# Patient Record
Sex: MALE | Race: WHITE | NOT HISPANIC OR LATINO | Employment: FULL TIME | URBAN - METROPOLITAN AREA
[De-identification: names, ages, dates, MRNs, and addresses within clinical notes are randomized per-mention and may not be internally consistent; named-entity substitution may affect disease eponyms.]

---

## 2019-01-26 ENCOUNTER — OFFICE VISIT (OUTPATIENT)
Dept: URGENT CARE | Age: 54
End: 2019-01-26
Payer: COMMERCIAL

## 2019-01-26 VITALS
DIASTOLIC BLOOD PRESSURE: 111 MMHG | RESPIRATION RATE: 20 BRPM | SYSTOLIC BLOOD PRESSURE: 163 MMHG | WEIGHT: 225 LBS | HEART RATE: 80 BPM | OXYGEN SATURATION: 100 % | HEIGHT: 74 IN | BODY MASS INDEX: 28.88 KG/M2 | TEMPERATURE: 98.1 F

## 2019-01-26 DIAGNOSIS — R21 RASH OF GROIN: Primary | ICD-10-CM

## 2019-01-26 PROCEDURE — 99203 OFFICE O/P NEW LOW 30 MIN: CPT | Performed by: PHYSICIAN ASSISTANT

## 2019-01-26 RX ORDER — NYSTATIN 100000 [USP'U]/G
POWDER TOPICAL 3 TIMES DAILY
Qty: 15 G | Refills: 0 | Status: SHIPPED | OUTPATIENT
Start: 2019-01-26 | End: 2021-12-03

## 2019-01-26 RX ORDER — MULTIVITAMIN
1 CAPSULE ORAL DAILY
COMMUNITY

## 2019-01-26 RX ORDER — AMPICILLIN TRIHYDRATE 250 MG
CAPSULE ORAL DAILY
COMMUNITY
End: 2021-12-03

## 2019-01-26 RX ORDER — CEPHALEXIN 500 MG/1
500 CAPSULE ORAL EVERY 8 HOURS SCHEDULED
Qty: 21 CAPSULE | Refills: 0 | Status: SHIPPED | OUTPATIENT
Start: 2019-01-26 | End: 2019-02-02

## 2019-01-26 RX ORDER — CHLORAL HYDRATE 500 MG
1000 CAPSULE ORAL DAILY
COMMUNITY

## 2019-01-26 NOTE — PROGRESS NOTES
[facility]        NAME: Lynn Pan is a 47 y o  male  : 1965    MRN: [id number]  DATE: 2019  TIME: 12:35 PM    Assessment and Plan   Rash of groin [R21]  1  Rash of groin  cephalexin (KEFLEX) 500 mg capsule    nystatin (MYCOSTATIN) powder     Likely folliculitis  Possibly folliculitis on top of a fungal infection  Will treat with powder and antibiotic  Patient Instructions       Take medications as directed  Drink plenty of fluids  Follow up with family doctor this week  Go to ER immediately if new or worsening symptoms occur  Chief Complaint     Chief Complaint   Patient presents with    Rash     rash on buttock, left groin, and scrotum  Onset 5 days          History of Present Illness       Rash   This is a new problem  Episode onset: Five days ago  The problem has been gradually worsening since onset  Location: Gluteal cleft, inguinal area and scrotum  Rash characteristics: Red, raised, pimple like  He was exposed to nothing  Pertinent negatives include no diarrhea, fatigue, fever, joint pain, rhinorrhea, shortness of breath or vomiting  Past treatments include nothing  Review of Systems   Review of Systems   Constitutional: Negative  Negative for chills, fatigue and fever  HENT: Negative  Negative for rhinorrhea  Eyes: Negative  Respiratory: Negative  Negative for shortness of breath  Cardiovascular: Negative  Gastrointestinal: Negative  Negative for abdominal pain, diarrhea and vomiting  Endocrine: Negative  Genitourinary: Negative  Musculoskeletal: Negative for back pain, joint pain and neck pain  Skin: Positive for rash  Negative for color change, pallor and wound  Neurological: Negative for dizziness, weakness, light-headedness, numbness and headaches  Hematological: Negative  Psychiatric/Behavioral: Negative            Current Medications       Current Outpatient Prescriptions:     Multiple Vitamin (MULTIVITAMIN) capsule, Take 1 capsule by mouth daily, Disp: , Rfl:     Omega-3 Fatty Acids (FISH OIL) 1,000 mg, Take 1,000 mg by mouth daily, Disp: , Rfl:     Red Yeast Rice 600 MG CAPS, Take by mouth, Disp: , Rfl:     cephalexin (KEFLEX) 500 mg capsule, Take 1 capsule (500 mg total) by mouth every 8 (eight) hours for 7 days, Disp: 21 capsule, Rfl: 0    nystatin (MYCOSTATIN) powder, Apply topically 3 (three) times a day, Disp: 15 g, Rfl: 0    Current Allergies     Allergies as of 01/26/2019    (No Known Allergies)            The following portions of the patient's history were reviewed and updated as appropriate: allergies, current medications, past family history, past medical history, past social history, past surgical history and problem list      History reviewed  No pertinent past medical history  History reviewed  No pertinent surgical history  History reviewed  No pertinent family history  Medications have been verified  Objective   BP (!) 163/111   Pulse 80   Temp 98 1 °F (36 7 °C) (Tympanic)   Resp 20   Ht 6' 2" (1 88 m)   Wt 102 kg (225 lb)   SpO2 100%   BMI 28 89 kg/m²        Physical Exam     Physical Exam   Constitutional: He appears well-developed and well-nourished  No distress  HENT:   Head: Normocephalic and atraumatic  Cardiovascular: Normal rate, regular rhythm, normal heart sounds and intact distal pulses  Pulmonary/Chest: Effort normal and breath sounds normal  No respiratory distress  He has no wheezes  He has no rales  Skin: Skin is warm  Rash (Red raised pimple like rash to gluteal cleft, inguinal area and L side of scrotum  No pain to testes or penis itself ) noted  He is not diaphoretic  Nursing note and vitals reviewed

## 2019-01-26 NOTE — PATIENT INSTRUCTIONS
Take medications as directed  Drink plenty of fluids  Follow up with family doctor this week  Go to ER immediately if new or worsening symptoms occur  Folliculitis   WHAT YOU NEED TO KNOW:   Folliculitis is inflammation of your hair follicles  A hair follicle is a sac under your skin  Your hair grows out of the follicle  Folliculitis is caused by bacteria or fungus, most commonly a germ called Staph  Folliculitis can occur anywhere you have hair  DISCHARGE INSTRUCTIONS:   Medicines:   · Antibiotics: This medicine is given to fight or prevent an infection caused by bacteria  It may be given as an ointment that you apply to your skin or as a pill  Always take your antibiotics exactly as ordered by your healthcare provider  Never save antibiotics or take leftover antibiotics that were given to you for another illness  · Antifungal medicine: This medicine helps kill fungus that may be causing your folliculitis  It may be given as an cream that you apply to your skin or as a pill  · NSAIDs , such as ibuprofen, help decrease swelling, pain, and fever  This medicine is available with or without a doctor's order  NSAIDs can cause stomach bleeding or kidney problems in certain people  If you take blood thinner medicine, always ask if NSAIDs are safe for you  Always read the medicine label and follow directions  Do not give these medicines to children under 10months of age without direction from your child's healthcare provider  · Antihistamines: This medicine may be given to help decrease itching  · Take your medicine as directed  Contact your healthcare provider if you think your medicine is not helping or if you have side effects  Tell him of her if you are allergic to any medicine  Keep a list of the medicines, vitamins, and herbs you take  Include the amounts, and when and why you take them  Bring the list or the pill bottles to follow-up visits   Carry your medicine list with you in case of an emergency  Follow up with your healthcare provider or dermatologist as directed:  Write down your questions so you remember to ask them during your visits  Manage folliculitis:   · Use warm compresses:  Wet a washcloth with warm water and apply it to the infected skin area to help decrease pain and swelling  Warm compresses may also help drain pus and improve healing  · Clean the area:  Use antibacterial soap to wash the affected area  Change your washcloths and towels every day  · Avoid shaving the area: If possible, do not shave areas that have folliculitis  If you must shave, use an electric razor or new blade every time you shave  Prevent folliculitis:   · Do not share personal items:  Do not share towels, soap, or any personal items with other people  · Do not wear tight clothing:  Do not wear tight-fitting clothes that rub against and irritate your skin  · Treat skin injuries right away:  Treat injuries such as cuts and scrapes right away  Wash them with warm, soapy water, and cover the area to prevent infection  Contact your healthcare provider or dermatologist if:  · You have a fever  · You have foul-smelling pus coming from the bumps on your skin  · Your rash is spreading  · You have questions or concerns about your condition or care  Return to the emergency department if:  · You develop large areas of red, warm, tender skin around the folliculitis  · You develop boils  © 2017 [address] Information is for End User's use only and may not be sold, redistributed or otherwise used for commercial purposes  All illustrations and images included in CareNotes® are the copyrighted property of A D A M , Inc  or Siva Richardson  The above information is an  only  It is not intended as medical advice for individual conditions or treatments   Talk to your doctor, nurse or pharmacist before following any medical regimen to see if it is safe and effective for you

## 2021-01-20 ENCOUNTER — TELEPHONE (OUTPATIENT)
Dept: INTERNAL MEDICINE CLINIC | Facility: CLINIC | Age: 56
End: 2021-01-20

## 2021-01-20 NOTE — TELEPHONE ENCOUNTER
If his wife is positive he has to isolate himself and sleep in a separate room and they have to wear masks and use  toilets if possible

## 2021-01-25 ENCOUNTER — TELEMEDICINE (OUTPATIENT)
Dept: INTERNAL MEDICINE CLINIC | Facility: CLINIC | Age: 56
End: 2021-01-25
Payer: COMMERCIAL

## 2021-01-25 VITALS — WEIGHT: 225 LBS | TEMPERATURE: 100 F | HEIGHT: 74 IN | BODY MASS INDEX: 28.88 KG/M2

## 2021-01-25 DIAGNOSIS — Z20.828 EXPOSURE TO SARS-ASSOCIATED CORONAVIRUS: Primary | ICD-10-CM

## 2021-01-25 PROCEDURE — 3725F SCREEN DEPRESSION PERFORMED: CPT | Performed by: INTERNAL MEDICINE

## 2021-01-25 PROCEDURE — 99213 OFFICE O/P EST LOW 20 MIN: CPT | Performed by: INTERNAL MEDICINE

## 2021-01-25 NOTE — PROGRESS NOTES
COVID-19 Virtual Visit     Assessment/Plan:    Problem List Items Addressed This Visit     None      Visit Diagnoses     Exposure to SARS-associated coronavirus    -  Primary    Relevant Orders    Novel Coronavirus (Covid-19),PCR [facility] - Collected at [facility] or [facility]         Disposition:     I referred patient to one of our centralized sites for a COVID-19 swab  I have spent 15 minutes directly with the patient  Greater than 50% of this time was spent in counseling/coordination of care regarding: instructions for management, patient and family education, importance of treatment compliance and risk factor reductions  Encounter provider Frances Snell MD    Provider located at [address] [address] [phone]    Recent Visits  No visits were found meeting these conditions  Showing recent visits within past 7 days and meeting all other requirements     Today's Visits  Date Type Provider Dept   01/25/21 Telemedicine Frances Snell MD [facility]  [address] today's visits and meeting all other requirements     Future Appointments  No visits were found meeting these conditions  Showing future appointments within next 150 days and meeting all other requirements        Patient agrees to participate in a virtual check in via telephone or video visit instead of presenting to the office to address urgent/immediate medical needs  Patient is aware this is a billable service  After connecting through [facility], the patient was identified by name and date of birth  Chiki Alford was informed that this was a telemedicine visit and that the exam was being conducted confidentially over secure lines  My office door was closed  No one else was in the room  Chiki Alford acknowledged consent and understanding of privacy and security of the telemedicine visit   I informed the patient that I have reviewed his record in Epic and presented the opportunity for him to ask any questions regarding the visit today  The patient agreed to participate  Subjective:   Dorothy Schwartz is a 48 y o  male who is concerned about COVID-19  Patient's symptoms include fever and headache  Patient denies shortness of breath, nausea, vomiting and diarrhea  Date of symptom onset: 1/21/2021    Exposure:   Contact with a person who is under investigation (PUI) for or who is positive for COVID-19 within the last 14 days?: Yes    Hospitalized recently for fever and/or lower respiratory symptoms?: No      Currently a healthcare worker that is involved in direct patient care?: No      Works in a special setting where the risk of COVID-19 transmission may be high? (this may include long-term care, correctional and shelter facilities; homeless shelters; assisted-living facilities and group homes ): No      Resident in a special setting where the risk of COVID-19 transmission may be high? (this may include long-term care, correctional and shelter facilities; homeless shelters; assisted-living facilities and group homes ): No      No results found for: Deepa Guillen, [facility], [address] [place] [address]  No past medical history on file  No past surgical history on file  Current Outpatient Medications   Medication Sig Dispense Refill    Multiple Vitamin (MULTIVITAMIN) capsule Take 1 capsule by mouth daily      nystatin (MYCOSTATIN) powder Apply topically 3 (three) times a day 15 g 0    Omega-3 Fatty Acids (FISH OIL) 1,000 mg Take 1,000 mg by mouth daily      Red Yeast Rice 600 MG CAPS Take by mouth       No current facility-administered medications for this visit  No Known Allergies    Review of Systems   Constitutional: Positive for fever  Respiratory: Negative for shortness of breath  Gastrointestinal: Negative for diarrhea, nausea and vomiting  Neurological: Positive for headaches  Objective:    Vitals:    01/25/21 1246   Temp: 100 °F (37 8 °C)   TempSrc: Temporal   Weight: 102 kg (225 lb)   Height: 6' 2" (1 88 m)       Physical Exam  Constitutional:       Appearance: Normal appearance  HENT:      Mouth/Throat:      Mouth: Mucous membranes are moist    Neck:      Musculoskeletal: Normal range of motion and neck supple  Pulmonary:      Effort: Pulmonary effort is normal    Neurological:      General: No focal deficit present  Mental Status: He is alert  VIRTUAL VISIT DISCLAIMER    Raiza Villa acknowledges that he has consented to an online visit or consultation  He understands that the online visit is based solely on information provided by him, and that, in the absence of a face-to-face physical evaluation by the physician, the diagnosis he receives is both limited and provisional in terms of accuracy and completeness  This is not intended to replace a full medical face-to-face evaluation by the physician  Raiza Villa understands and accepts these terms

## 2021-01-26 ENCOUNTER — TELEPHONE (OUTPATIENT)
Dept: INTERNAL MEDICINE CLINIC | Facility: CLINIC | Age: 56
End: 2021-01-26

## 2021-01-26 DIAGNOSIS — Z20.828 EXPOSURE TO SARS-ASSOCIATED CORONAVIRUS: Primary | ICD-10-CM

## 2021-01-26 DIAGNOSIS — Z20.828 EXPOSURE TO SARS-ASSOCIATED CORONAVIRUS: ICD-10-CM

## 2021-01-26 PROCEDURE — U0005 INFEC AGEN DETEC AMPLI PROBE: HCPCS | Performed by: INTERNAL MEDICINE

## 2021-01-26 PROCEDURE — U0003 INFECTIOUS AGENT DETECTION BY NUCLEIC ACID (DNA OR RNA); SEVERE ACUTE RESPIRATORY SYNDROME CORONAVIRUS 2 (SARS-COV-2) (CORONAVIRUS DISEASE [COVID-19]), AMPLIFIED PROBE TECHNIQUE, MAKING USE OF HIGH THROUGHPUT TECHNOLOGIES AS DESCRIBED BY CMS-2020-01-R: HCPCS | Performed by: INTERNAL MEDICINE

## 2021-01-26 RX ORDER — METHYLPREDNISOLONE 4 MG/1
TABLET ORAL
Qty: 21 EACH | Refills: 0 | Status: SHIPPED | OUTPATIENT
Start: 2021-01-26 | End: 2021-12-03

## 2021-01-27 ENCOUNTER — TELEMEDICINE (OUTPATIENT)
Dept: INTERNAL MEDICINE CLINIC | Facility: CLINIC | Age: 56
End: 2021-01-27
Payer: COMMERCIAL

## 2021-01-27 VITALS — BODY MASS INDEX: 28.88 KG/M2 | WEIGHT: 225 LBS | HEIGHT: 74 IN

## 2021-01-27 DIAGNOSIS — Z20.828 EXPOSURE TO SARS-ASSOCIATED CORONAVIRUS: ICD-10-CM

## 2021-01-27 DIAGNOSIS — U07.1 COVID-19: Primary | ICD-10-CM

## 2021-01-27 PROCEDURE — 1036F TOBACCO NON-USER: CPT | Performed by: INTERNAL MEDICINE

## 2021-01-27 PROCEDURE — 99213 OFFICE O/P EST LOW 20 MIN: CPT | Performed by: INTERNAL MEDICINE

## 2021-01-27 PROCEDURE — 3008F BODY MASS INDEX DOCD: CPT | Performed by: INTERNAL MEDICINE

## 2021-01-27 NOTE — PROGRESS NOTES
COVID-19 Virtual Visit     Assessment/Plan:    Problem List Items Addressed This Visit     None      Visit Diagnoses     COVID-19    -  Primary    Exposure to SARS-associated coronavirus             Disposition:     I have spent 15 minutes directly with the patient  Greater than 50% of this time was spent in counseling/coordination of care regarding: instructions for management, patient and family education, risk factor reductions and impressions  Encounter provider Chandra Schrader MD    Provider located at [address] [address] [phone]    Recent Visits  Date Type Provider Dept   01/27/21 [address], [address] [facility]   01/26/21 Telephone MD Santana Rader [facility]  74 [facility]   01/25/21 Telemedicine MD Santana Rader [facility]  74 Internal Med [address] recent visits within past 7 days and meeting all other requirements     Future Appointments  No visits were found meeting these conditions  Showing future appointments within next 150 days and meeting all other requirements      This virtual check-in was done via [employer] and patient was informed that this is not a secure, HIPAA-compliant platform  He agrees to proceed  Patient agrees to participate in a virtual check in via telephone or video visit instead of presenting to the office to address urgent/immediate medical needs  Patient is aware this is a billable service  After connecting through [facility], the patient was identified by name and date of birth  Nesha Mohs was informed that this was a telemedicine visit and that the exam was being conducted confidentially over secure lines  Nesha Mohs acknowledged consent and understanding of privacy and security of the telemedicine visit   I informed the patient that I have reviewed his record in Epic and presented the opportunity for him to ask any questions regarding the visit today  The patient agreed to participate  Subjective:   Dulce Terry is a 48 y o  male who has been screened for COVID-19  Symptom change since last report: improving  Patient's symptoms include chills, fatigue, malaise and nasal congestion  Patient denies fever, sore throat, shortness of breath, abdominal pain, diarrhea and headaches  Kaleb Lucio has been staying home and has isolated themselves in his home  He is taking care to not share personal items and is cleaning all surfaces that are touched often, like counters, tabletops, and doorknobs using household cleaning sprays or wipes  He is wearing a mask when he leaves his room  Lab Results   Component Value Date    SARSCOV2 Positive (A) 01/26/2021     Past Medical History:   Diagnosis Date    No pertinent past medical history      Past Surgical History:   Procedure Laterality Date    COLONOSCOPY  02/10/2017    HERNIA REPAIR      age 12      Current Outpatient Medications   Medication Sig Dispense Refill    methylPREDNISolone 4 MG tablet therapy pack Use as directed on package 21 each 0    Multiple Vitamin (MULTIVITAMIN) capsule Take 1 capsule by mouth daily      Omega-3 Fatty Acids (FISH OIL) 1,000 mg Take 1,000 mg by mouth daily      Red Yeast Rice 600 MG CAPS Take by mouth      nystatin (MYCOSTATIN) powder Apply topically 3 (three) times a day 15 g 0     No current facility-administered medications for this visit  No Known Allergies    Review of Systems   Constitutional: Positive for chills and fatigue  Negative for appetite change and fever  HENT: Positive for congestion  Negative for sore throat and trouble swallowing  Eyes: Negative for redness  Respiratory: Negative for shortness of breath  Cardiovascular: Negative for chest pain and palpitations  Gastrointestinal: Negative for abdominal pain, constipation and diarrhea  Genitourinary: Negative for dysuria and hematuria     Musculoskeletal: Negative for back pain and neck pain  Skin: Negative for rash  Neurological: Negative for seizures, weakness and headaches  Hematological: Negative for adenopathy  Psychiatric/Behavioral: Negative for confusion  The patient is not nervous/anxious  Objective:    Vitals:    01/27/21 1438   Weight: 102 kg (225 lb)   Height: 6' 2" (1 88 m)       Physical Exam  Constitutional:       Appearance: Normal appearance  HENT:      Head: Normocephalic and atraumatic  Mouth/Throat:      Mouth: Mucous membranes are moist    Eyes:      Extraocular Movements: Extraocular movements intact  Pupils: Pupils are equal, round, and reactive to light  Neck:      Musculoskeletal: Normal range of motion and neck supple  Pulmonary:      Effort: Pulmonary effort is normal    Musculoskeletal: Normal range of motion  Neurological:      General: No focal deficit present  Mental Status: He is alert and oriented to person, place, and time  Mental status is at baseline  Psychiatric:         Mood and Affect: Mood normal          Behavior: Behavior normal        VIRTUAL VISIT DISCLAIMER    Chiki Alford acknowledges that he has consented to an online visit or consultation  He understands that the online visit is based solely on information provided by him, and that, in the absence of a face-to-face physical evaluation by the physician, the diagnosis he receives is both limited and provisional in terms of accuracy and completeness  This is not intended to replace a full medical face-to-face evaluation by the physician  Chiki Alford understands and accepts these terms

## 2021-01-28 LAB — SARS-COV-2 RNA RESP QL NAA+PROBE: POSITIVE

## 2021-01-28 NOTE — PATIENT INSTRUCTIONS
COVID-19 and Chronic Health Conditions   AMBULATORY CARE:   What you need to know about coronavirus disease 2019 (COVID-19) and chronic health conditions: Your chronic condition may increase your risk for COVID-19 or serious problems it can cause, such as pneumonia  Healthcare providers might need to make changes that affect how you usually manage your chronic health condition  Providers may change hours of operation or not have patients come in to be seen  You may not be able to make appointments to get blood drawn or to have tests or procedures  This may continue until the virus that causes COVID-19 is controlled  Until then, you can take steps to manage your condition  The steps will also lower your risk for COVID-19 or the serious problems it causes  The following increase your risk for serious effects of COVID-19:   · Age 72 years or older    · Obesity, diabetes, cancer, or a liver disease    · Kidney failure, chronic kidney disease, or sickle cell disease    · Lung disease, COPD, moderate-to-severe asthma, cystic fibrosis, or pulmonary fibrosis (scarring in your lungs)    · A severe heart disease, such as coronary artery disease or heart failure    · A brain blood vessel disorder or disease, or a condition such as dementia    · Living in a nursing home or long-term care facility    · Smoking cigarettes    · Hypertension (high blood pressure) or thalassemia    · An immune system problem, HIV or AIDS, or a blood or bone marrow transplant    · Long-term use of certain medicines, such as corticosteroids    · Pregnancy    If you think you may be infected with the new coronavirus,  do the following to protect others:  · If emergency care is needed,  tell the  about the possible infection, or call ahead and tell the emergency department  · Call a healthcare provider  for instructions if symptoms are mild   Do not  arrive without calling first  Your provider will need to protect staff members and other patients  · Cover your mouth and nose  while you are getting medical care  This will help lower the risk of infecting others  Call your local emergency number [phone] in the [address] [facility] or emergency department if:   · You have trouble breathing or shortness of breath  · You have chest pain or pressure that lasts longer than 5 minutes  · You become confused or hard to wake  · Your lips or face are blue  · You have a fever of 104°F (40°C) or higher  Call your doctor or healthcare provider if:   · You do not  have symptoms of COVID-19 but had close physical contact within 14 days with someone who tested positive  · You have questions or concerns about your COVID-19 or your chronic condition  The following may increase your risk for serious effects of COVID-19:   · Age 72 years or older    · Obesity, diabetes, cancer, or a liver disease    · Kidney failure, chronic kidney disease, or sickle cell disease    · Lung disease, COPD, moderate-to-severe asthma, cystic fibrosis, or pulmonary fibrosis (scarring in your lungs)    · A severe heart disease, such as coronary artery disease or heart failure    · A brain blood vessel disorder or disease, or a condition such as dementia    · Living in a nursing home or long-term care facility    · Smoking cigarettes    · Hypertension (high blood pressure) or thalassemia    · An immune system problem, HIV or AIDS, a blood or bone marrow transplant    · Long-term use of certain medicines, such as corticosteroids    · Pregnancy    Manage your chronic health condition during this time:  If you do not have a regular healthcare provider, experts recommend you contact a local community health center or health department  The following can help you manage your condition and prevent COVID-19:  · Get emergency care for your condition if needed  Talk to your healthcare providers about symptoms of your chronic condition that need immediate care   Your providers can help you create a plan or add exacerbation management to your plan  The plan will include when to go to an emergency department and when to call your local emergency number  This will depend on where you live and the services that are available during this time  · Go to dialysis appointments as scheduled  It is important to stay on schedule  You will need to have enough food to be able to follow the emergency diet plan if you must miss a session  The emergency diet needs to be part of the management plan for your condition  · Reschedule any upcoming appointments as needed  Medical facilities may be closed until the new coronavirus is better controlled  This means you may need to reschedule a surgery, procedure, or check-up appointment  If you cannot have a phone or video appointment, you will need to make a new appointment  Some providers may be scheduling appointments several months in advance  Some surgeries and procedures will happen as scheduled  This depends on the medical condition and the reason for the surgery or procedure  You may need to have extra testing for COVID-19 several days before  · Follow any regular management plan you use  Your healthcare provider will tell you if you need to make any changes to your regular management plan  For example, if you have asthma, continue to follow your asthma action plan  If you have diabetes, you may need to check your blood sugar level more often  Stress and illness can make blood sugar levels go up  You may need to adjust medicine such as insulin  If you have a heart condition or high blood pressure, you may need to check your blood pressure more often  Stress and illness can also raise your blood pressure  · Talk to your healthcare providers about your medicines  You may be able to get more than 1 month of medicine at a time  This will lower the number of times you need to go to a pharmacy to get your medicines   Make sure you have enough medicine if you have a condition that can lead to an emergency  Examples include asthma medicines, insulin, or an epinephrine pen  Check the expiration dates on the medicines you currently have  Ask for refills as soon as possible, if needed  If it is not time to refill prescriptions, you may be able to get an emergency supply of some medicines  Medicine plans vary, so ask your healthcare provider or pharmacist for options  · Have supplies available in your home  If possible, get extra supplies you use regularly  Examples include absorbent pads, syringes, and wound cleaning solutions  This will limit the number of trips out of your home to get supplies  · Know the signs and symptoms of COVID-19  Signs and symptoms usually start about 5 days after infection but can take 2 to 14 days  Signs and symptoms range from mild to severe  You may feel like you have the flu or a bad cold  Your chronic health condition may cause some of the same symptoms COVID-19 causes  This can make it hard to know if a symptom is from COVID-19 or your chronic condition  Keep a record of any new or worsening symptom you have  This is especially important if you have a condition that often causes shortness of breath  Your provider can tell you if you should be tested for COVID-19  Information is still being learned  Tell your healthcare provider if you think you were infected but develop signs or symptoms not listed below:    ? A cough    ? Shortness of breath or trouble breathing that may become severe    ? A fever of at least 100 4°F, or 38°C (may be lower in adults 65 or older)    ? Chills that might include shaking    ? Muscle pain, body aches, or a headache    ? A sore throat    ? Suddenly not being able to taste or smell anything    ? Feeling very tired (fatigue)    ? Congestion (stuffy head and nose), or a runny nose    ?  Diarrhea, nausea, or vomiting    What you can do to prevent having to go out of your home during this time:   · Ask your healthcare provider for other ways to have appointments  You may be able to have appointments without having to go into the provider's office  Some providers offer phone, video, or other types of appointments  · Have food, medicines, and other supplies delivered  Some pharmacies can send certain medicines to you through the mail  Grocery stores and restaurants may be able to deliver food and other items  If possible, have delivered items placed somewhere  Try not to have someone hand you an item  You will be so close to the person that the virus can spread between you  Wash your hands after you put the delivered items away  · Ask someone to get items you need  The person can get groceries, medicines, or other needed items for you  Choose a person who does not have signs or symptoms of COVID-19 or has tested negative for it  The person should not be waiting for test results  He or she should not have a condition that increases the risk for COVID-19 or serious problems it causes  Lower your risk for COVID-19:  The virus that causes COVID-19 spreads quickly and easily  It mainly spreads through droplets that form when a person talks, coughs, or sneezes  An infected person may also be able to leave the virus on objects and surfaces  Another person can get the virus on his or her hands by touching the object or surface  Infection happens if the person then touches his or her eyes or mouth with unwashed hands  The best way to prevent infection is to avoid anyone who is infected, but this can be hard to do  An infected person can spread the virus before signs or symptoms begin, or even if signs or symptoms never develop  The following are ways to prevent the spread of the virus and lower your risk for COVID-19:      · Wash your hands often throughout the day  Use soap and water  Rub your soapy hands together, lacing your fingers  Wash the front and back of each hand, and in between your fingers   Use the fingers of one hand to scrub under the fingernails of the other hand  Wash for at least 20 seconds  Rinse with warm, running water for several seconds  Then dry your hands with a clean towel or paper towel  Use hand  that contains alcohol if soap and water are not available  Do not touch your eyes, nose, or mouth without washing your hands first  Teach children how to wash their hands  · Cover sneezes and coughs  Turn your face away and cover your mouth and nose with a tissue  Throw the tissue away  Use the bend of your arm if a tissue is not available  Then wash your hands well with soap and water or use hand   Turn your head and cover your face if someone near you is coughing or sneezing  Teach children how to cover a cough or sneeze  Remind them to wash their hands  · Make a habit of not touching your face  If you get the virus on your hands, you can transfer it to your eyes, nose, or mouth and become infected  · Clean and disinfect high-touch surfaces and objects in your home often  Do this regularly, even if you think no one living in or coming to your home is infected with the virus  Surfaces include countertops, cupboard doors, desks, handles, handrails, doorknobs, toilet handles, faucets, chairs, and light switches  Objects include keys, phones, computer keyboards and mice, video games, remote controls, and children's toys  Some surfaces and objects may need to be cleaned several times each day, depending on how often they are used  ? Use disinfecting wipes or a disinfecting solution  You can make a solution by mixing 4 teaspoons of bleach with 1 quart (4 cups) of water  Clean with a sponge or cloth that can be thrown away or washed in hot, soapy water and reused  Clean used dishes and utensils in hot, soapy water or in the   ? Be careful with   Do not use any cleaning or disinfecting products that can trigger an asthma attack or other breathing problems   Open windows or have circulating air as you clean  Do not  mix ammonia with bleach  This will create toxic fumes  Read the labels of all products you use  · Ask about vaccines you may need  No vaccine is available yet for the new coronavirus  It is still a good idea to get other vaccines to help protect your immune system  Get the influenza (flu) vaccine as soon as recommended each year, usually starting in September or October  A flu infection can make COVID-19 worse if you have them at the same time  The flu can also cause signs and symptoms that are similar to COVID-19  Get the pneumonia vaccine if recommended  Your healthcare provider can tell you if you also need other vaccines, and when to get them  Follow social distancing guidelines if you must go out of your home during this time:  Social distancing means people stay far enough apart physically that the virus cannot spread from one person to another  National and local social distancing rules vary  Rules may change over time as restrictions are lifted, but they may return if an outbreak happens where you live  It is important to know and follow all current social distancing rules in your area  The following are general guidelines:  · Limit trips out of your home  Most local guidelines allow leaving the home to buy food or supplies, or to seek medical care if necessary  · Stay at least 6 feet (2 meters) away from anyone who does not live in your home  Do not shake hands with, hug, or kiss a person as a greeting  Stand or walk as far from others as possible, especially around anyone who is sneezing or coughing  If you must use public transportation (such as a bus or subway), try to sit or stand away from others  You can stay safely connected with others through phone calls, e-mail messages, social media websites, and video chats  Check in on anyone who may be having a hard time socially distancing, or who lives alone   Ask administrators at nursing [facility] or long-term care facilities how you can safely communicate with someone living there  · Wear a cloth face covering (mask) when you must go out  Only do this if your chronic condition does not cause breathing problems  You can make a face covering out of a thick cloth  This will save medical masks for healthcare workers and first responders  Choose fabric that holds its shape after it is washed and dried, such as cotton  Put the top half of a paper coffee filter in the middle of the fabric to create an extra filter for you  Check that you can breathe through the fabric when it is folded into several layers  Fold the fabric into a long band  Put each end through a rubber band or hair tie to create ear loops  Fold the ends of the fabric toward the middle  Hold the covering to your face  Adjust it so it covers your nose and mouth completely  Hook the loops onto your ears to keep the covering in place  The following are important points to remember:     ? Do not use a plastic face shield instead of a cloth covering  A face shield will not protect others from droplets  If a face shield must be used, choose one that wraps around both sides of the face and goes below the chin  Do not use disposable shields more than 1 time  Merly Wagner that can be used again need to be cleaned and disinfected between uses  Do not put a face shield or a cloth covering on a  or infant  These increase the risk for sudden infant death syndrome (SIDS)  ? Continue social distancing while you are out  A face covering does not mean you can have close physical contact with others  You still need to stay at least 6 feet (2 meters) away from others  ? Do not touch your face covering or eyes while you are wearing the covering  Your eyes will not be covered by the cloth  You can be infected if the virus is on your hand and you touch your eyes   Wash your hands with soap and water for 20 seconds or use hand  before you remove your face covering  ? Do not remove your face covering to talk, sneeze, or cough  Your face covering will help protect you and others around you  ? Wash face coverings often  Wash them in a washing machine in the warmest water the fabric allows  Make sure the fabric is completely dry before you use the face covering again  Only wear clean coverings when you go out  Use a new coffee filter each time  ? Certain individuals should not use face coverings  Do not put a face covering on anyone who is younger than 2 years  Mohan Oro children may not be able to breathe through the covering  Do not put a face covering on anyone who cannot remove it by himself or herself  ? You can use a clear face covering if others need to read your lips  A clear covering may be helpful if you communicate with someone who is deaf or hard of hearing  A clear covering is made of cloth but has plastic over the mouth area  This will help the person see your lips more easily  Do not use a plastic face shield instead  ? Do not use face coverings that have breathing valves or vents  Valves or vents on the sides are designed to allow breathed air to go out  This makes breathing easier, but the virus can travel out of the valve or vent and be spread to others  · Do not have anyone over to your home unless it is necessary  It is okay to have medical workers in to provide care  Wear your face covering, and remind medical workers to wear a mask or face covering  Remind them to wash their hands when they arrive and before they leave  Do not  have family members, friends, or neighbors over, even if they are not sick  A person can pass the new virus to others before symptoms of COVID-19 begin  Some people never even develop symptoms  Children commonly have mild symptoms or no symptoms  It is important that you continue social distancing with everyone, including children  It may be hard to tell a child not to hug or kiss you   Explain that this is how he or she can help you stay healthy  · Do not go to someone else's home unless it is necessary  Do not go over to visit, even if the person is lonely  Only go if you need to help him or her  · Avoid large gatherings and crowds  Gatherings or crowds of 10 or more individuals can cause the virus to spread  Examples of gatherings include parties, sporting events, [employer] services, and conferences  Crowds may form at beaches, [place], and tourist attractions  You can continue to protect yourself by staying away from large gatherings and crowds  · Stay safe if you must go out to work  You may have a job only done outside your home that is considered essential  Keep physical distance between you and other workers as much as possible  Follow your employer's rules so everyone stays safe  Help strengthen your immune system:   · Do not smoke  Nicotine and other chemicals in cigarettes and cigars can cause lung damage and increase your risk for infections such as bronchitis or pneumonia  Ask your healthcare provider for information if you currently smoke and need help to quit  E-cigarettes or smokeless tobacco still contain nicotine  Talk to your healthcare provider before you use these products  · Eat a variety of healthy foods  Examples include vegetables, fruits, whole-grain breads and cereals, lean meats and poultry, fish, low-fat dairy products, and cooked beans  Healthy foods contain nutrients that help keep your immune system strong  · Find ways to manage stress  You may be feeling more stressed than usual because of the COVID-19 outbreak  The situation is very stressful to many people  Talk to your healthcare providers about ways to manage stress during this time  Stress can lead to breathing problems or make the problems worse  Stress can trigger an attack or exacerbation of many health conditions   It is important to do things that help you feel more relaxed, such as the following:     ? Pick 1 or 2 times a day to watch the news  Constant news watching can increase your stress levels  ? Talk to a friend on the phone or through a video chat  ? Take a warm, soothing bath  ? Listen to music  ? Exercise can also help relieve stress  This may be hard if your regular gym or outdoor exercise area is closed  If you do not have exercise equipment at home, try walking inside your home  You can walk quickly or turn on music and dance  Follow up with your doctor or healthcare provider as directed: Your providers will tell you when you can come in for tests, procedures, or check-ups  Bring your symptom record with you to all appointments  Write down your questions so you remember to ask them during your visits  For more information:   · Centers for Disease Control and Prevention  [address] , [address]  Phone: 7- 447 - 5705630  Phone: 1- 121 - 8339216  Web Address: [employer] com     © Copyright [address] Information is for End User's use only and may not be sold, redistributed or otherwise used for commercial purposes  All illustrations and images included in CareNotes® are the copyrighted property of [employer]  or [facility] Gunjan Hoang   The above information is an  only  It is not intended as medical advice for individual conditions or treatments  Talk to your doctor, nurse or pharmacist before following any medical regimen to see if it is safe and effective for you

## 2021-01-29 ENCOUNTER — TELEPHONE (OUTPATIENT)
Dept: ADMINISTRATIVE | Facility: OTHER | Age: 56
End: 2021-01-29

## 2021-01-29 NOTE — LETTER
Procedure Request Form: Colonoscopy      Date Requested: 21  Patient: Wendy Jamison  Patient : 1965   Referring Provider: Render Christians, MD        Date of Procedure ______________________________       The above patient has informed us that they have completed their   most recent Colonoscopy at your facility  Please complete   this form and attach all corresponding procedure reports/results  Comments __________________________________________________________  ____________________________________________________________________  ____________________________________________________________________  ____________________________________________________________________    Facility Completing Procedure _________________________________________    Form Completed By (print name) _______________________________________      Signature __________________________________________________________      These reports are needed for  compliance    Please fax this completed form and a copy of the procedure report to our office located at [place] [address] as soon as possible to [phone] nan Mcelroy: Phone [phone]    We thank you for your assistance in treating our mutual patient

## 2021-01-29 NOTE — TELEPHONE ENCOUNTER
----- Message from Ede Branch sent at 1/27/2021  2:40 PM EST -----  Regarding: CRC  01/27/21 2:41 PM    Hello, our patient Shawanda Fraga has had CRC: Colonoscopy completed/performed  Please assist in updating the patient chart by making an External outreach to [facility] facility located in [place] [address]  The date of service is 2/10/17      Thank you,  Sandy Rome PG [facility] INTERNAL MED

## 2021-01-29 NOTE — LETTER
Procedure Request Form: Colonoscopy      Date Requested: 21  Patient: Wendy Swift  Patient : 1965   Referring Provider: Render Christians, MD        Date of Procedure ______________________________       The above patient has informed us that they have completed their   most recent Colonoscopy and CT Colonography at your facility  Please complete   this form and attach all corresponding procedure reports/results  Comments __________________________________________________________  ____________________________________________________________________  ____________________________________________________________________  ____________________________________________________________________    Facility Completing Procedure _________________________________________    Form Completed By (print name) _______________________________________      Signature __________________________________________________________      These reports are needed for  compliance    Please fax this completed form and a copy of the procedure report to our office located at [place] [address] as soon as possible to [phone] [facility]: Phone [phone]    We thank you for your assistance in treating our mutual patient

## 2021-02-04 NOTE — TELEPHONE ENCOUNTER
Upon review of the In Basket request we were able to locate, review, and update the patient chart as requested for CRC: Colonoscopy  Any additional questions or concerns should be emailed to the Practice Liaisons via [email or web address] email, please do not reply via In Basket      Thank you  Tania Kasper

## 2021-02-04 NOTE — TELEPHONE ENCOUNTER
As a follow-up, a second attempt has been made for outreach via fax, please see Contacts section for details      Thank you  Jaime Vo

## 2021-04-05 DIAGNOSIS — Z23 ENCOUNTER FOR IMMUNIZATION: ICD-10-CM

## 2021-04-16 ENCOUNTER — OFFICE VISIT (OUTPATIENT)
Dept: INTERNAL MEDICINE CLINIC | Facility: CLINIC | Age: 56
End: 2021-04-16
Payer: COMMERCIAL

## 2021-04-16 VITALS
SYSTOLIC BLOOD PRESSURE: 147 MMHG | DIASTOLIC BLOOD PRESSURE: 94 MMHG | OXYGEN SATURATION: 99 % | HEIGHT: 74 IN | HEART RATE: 79 BPM | WEIGHT: 223.8 LBS | TEMPERATURE: 98.6 F | BODY MASS INDEX: 28.72 KG/M2

## 2021-04-16 DIAGNOSIS — Z00.00 ANNUAL PHYSICAL EXAM: ICD-10-CM

## 2021-04-16 DIAGNOSIS — Z00.01 ENCOUNTER FOR GENERAL ADULT MEDICAL EXAMINATION WITH ABNORMAL FINDINGS: Primary | ICD-10-CM

## 2021-04-16 DIAGNOSIS — R03.0 WHITE COAT SYNDROME WITH HIGH BLOOD PRESSURE BUT WITHOUT HYPERTENSION: ICD-10-CM

## 2021-04-16 DIAGNOSIS — Z12.5 PROSTATE CANCER SCREENING: ICD-10-CM

## 2021-04-16 DIAGNOSIS — E55.9 VITAMIN D DEFICIENCY: ICD-10-CM

## 2021-04-16 PROCEDURE — 99396 PREV VISIT EST AGE 40-64: CPT | Performed by: INTERNAL MEDICINE

## 2021-04-16 PROCEDURE — 3008F BODY MASS INDEX DOCD: CPT | Performed by: INTERNAL MEDICINE

## 2021-04-16 PROCEDURE — 1036F TOBACCO NON-USER: CPT | Performed by: INTERNAL MEDICINE

## 2021-04-16 NOTE — PATIENT INSTRUCTIONS

## 2021-04-16 NOTE — PROGRESS NOTES
ADULT ANNUAL [address] [facility] INTERNAL MEDICINE    NAME: Gerry Gallo  AGE: 48 y o  SEX: male  : 1965     DATE: 2021     Assessment and Plan:     Problem List Items Addressed This Visit     None      Visit Diagnoses     Encounter for general adult medical examination with abnormal findings    -  Primary    White coat syndrome with high blood pressure but without hypertension        Vitamin D deficiency        Annual physical exam              Immunizations and preventive care screenings were discussed with patient today  Appropriate education was printed on patient's after visit summary  Counseling:  · Alcohol/drug use: discussed moderation in alcohol intake, the recommendations for healthy alcohol use, and avoidance of illicit drug use  No follow-ups on file  Chief Complaint:     Chief Complaint   Patient presents with    Physical Exam     yearly physical      History of Present Illness:     Adult Annual Physical   Patient here for a comprehensive physical exam  The patient reports no problems  Diet and Physical Activity  · Diet/Nutrition: well balanced diet  · Exercise: no formal exercise  Depression Screening  PHQ-9 Depression Screening    PHQ-9:   Frequency of the following problems over the past two weeks:           General Health  · Sleep: gets 4-6 hours of sleep on average  · Hearing: normal - bilateral   · Vision: no vision problems and previous LASIK surgery  · Dental: regular dental visits   Health  · Symptoms include: none     Review of Systems:     Review of Systems   Constitutional: Negative for appetite change, chills, fatigue and fever  HENT: Negative for sore throat and trouble swallowing  Eyes: Negative for redness  Respiratory: Negative for shortness of breath  Cardiovascular: Negative for chest pain and palpitations     Gastrointestinal: Negative for abdominal pain, constipation and diarrhea  Genitourinary: Negative for dysuria and hematuria  Musculoskeletal: Negative for back pain and neck pain  Skin: Negative for rash  Neurological: Negative for seizures, weakness and headaches  Hematological: Negative for adenopathy  Psychiatric/Behavioral: Negative for confusion  The patient is not nervous/anxious         Past Medical History:     Past Medical History:   Diagnosis Date    COVID-19     No pertinent past medical history       Past Surgical History:     Past Surgical History:   Procedure Laterality Date    COLONOSCOPY  02/10/2017    HERNIA REPAIR      age 12       Family History:     Family History   Problem Relation Age of Onset    No Known Problems Mother     No Known Problems Sister     No Known Problems Brother       Social History:     E-Cigarette/Vaping    E-Cigarette Use Never User      E-Cigarette/Vaping Substances    Nicotine No     THC No     CBD No     Flavoring No     Other No     Unknown No      Social History     Socioeconomic History    Marital status: /Civil Union     Spouse name: None    Number of children: None    Years of education: None    Highest education level: None   Occupational History    None   Social Needs    Financial resource strain: None    Food insecurity     Worry: None     Inability: None    Transportation needs     Medical: None     Non-medical: None   Tobacco Use    Smoking status: Never Smoker    Smokeless tobacco: Never Used   Substance and Sexual Activity    Alcohol use: Yes     Comment: social    Drug use: No    Sexual activity: None   Lifestyle    Physical activity     Days per week: None     Minutes per session: None    Stress: None   Relationships    Social connections     Talks on phone: None     Gets together: None     Attends Confucianist service: None     Active member of club or organization: None     Attends meetings of clubs or organizations: None     Relationship status: None    Intimate partner violence     Fear of current or ex partner: None     Emotionally abused: None     Physically abused: None     Forced sexual activity: None   Other Topics Concern    None   Social History Narrative    Alcohol: No - As per [employer]      Current Medications:     Current Outpatient Medications   Medication Sig Dispense Refill    Coenzyme Q10 (CO Q 10 PO) Take by mouth daily      Cyanocobalamin (VITAMIN B-12 PO) Take by mouth daily      Multiple Vitamin (MULTIVITAMIN) capsule Take 1 capsule by mouth daily      Omega-3 Fatty Acids (FISH OIL) 1,000 mg Take 1,000 mg by mouth daily      Red Yeast Rice 600 MG CAPS Take by mouth daily       VITAMIN D PO Take by mouth daily      methylPREDNISolone 4 MG tablet therapy pack Use as directed on package 21 each 0    nystatin (MYCOSTATIN) powder Apply topically 3 (three) times a day (Patient not taking: Reported on 4/16/2021) 15 g 0     No current facility-administered medications for this visit  Allergies:     No Known Allergies   Physical Exam:     /94 (BP Location: Left arm, Patient Position: Sitting, Cuff Size: Adult)   Pulse 79   Temp 98 6 °F (37 °C) (Temporal)   Ht 6' 2" (1 88 m)   Wt 102 kg (223 lb 12 8 oz)   SpO2 99%   BMI 28 73 kg/m²     Physical Exam  Vitals signs and nursing note reviewed  Constitutional:       Appearance: He is well-developed  HENT:      Head: Normocephalic and atraumatic  Right Ear: Tympanic membrane normal       Left Ear: Tympanic membrane normal       Nose: Nose normal       Mouth/Throat:      Mouth: Mucous membranes are dry  Eyes:      Conjunctiva/sclera: Conjunctivae normal    Neck:      Musculoskeletal: Normal range of motion and neck supple  Cardiovascular:      Rate and Rhythm: Normal rate and regular rhythm  Heart sounds: No murmur  Pulmonary:      Effort: Pulmonary effort is normal  No respiratory distress  Breath sounds: Normal breath sounds  Abdominal:      General: Abdomen is flat  Bowel sounds are normal       Palpations: Abdomen is soft  Tenderness: There is no abdominal tenderness  Genitourinary:     Prostate: Normal       Rectum: Normal  Guaiac result negative  Skin:     General: Skin is warm and dry  Neurological:      General: No focal deficit present  Mental Status: He is alert and oriented to person, place, and time  BMI Counseling: Body mass index is 28 73 kg/m²  The BMI is above normal  Nutrition recommendations include reducing portion sizes, decreasing overall calorie intake and 3-5 servings of fruits/vegetables daily    Serena Gibbs MD  [facility] INTERNAL MEDICINE

## 2021-04-30 ENCOUNTER — TELEPHONE (OUTPATIENT)
Dept: INTERNAL MEDICINE CLINIC | Facility: CLINIC | Age: 56
End: 2021-04-30

## 2021-04-30 NOTE — TELEPHONE ENCOUNTER
Patient came into the office today with his new [employer] Wrist bp machine, his machine read 134/91 HR-71  Our machine- 161/99 HR-75  I check manual and it was 138/92  He thinks our machine may need to be calibrated

## 2021-05-03 NOTE — TELEPHONE ENCOUNTER
I  Did tell pt you do not recommend the wrist machine when he was here, I also left a message for him telling him again

## 2021-06-04 ENCOUNTER — CLINICAL SUPPORT (OUTPATIENT)
Dept: INTERNAL MEDICINE CLINIC | Facility: CLINIC | Age: 56
End: 2021-06-04
Payer: COMMERCIAL

## 2021-06-04 DIAGNOSIS — Z23 ENCOUNTER FOR IMMUNIZATION: Primary | ICD-10-CM

## 2021-06-04 PROCEDURE — 90750 HZV VACC RECOMBINANT IM: CPT

## 2021-06-04 PROCEDURE — 90471 IMMUNIZATION ADMIN: CPT

## 2021-10-22 ENCOUNTER — OFFICE VISIT (OUTPATIENT)
Dept: UROLOGY | Facility: MEDICAL CENTER | Age: 56
End: 2021-10-22
Payer: COMMERCIAL

## 2021-10-22 VITALS
DIASTOLIC BLOOD PRESSURE: 82 MMHG | HEIGHT: 74 IN | BODY MASS INDEX: 28.62 KG/M2 | WEIGHT: 223 LBS | SYSTOLIC BLOOD PRESSURE: 148 MMHG

## 2021-10-22 DIAGNOSIS — Z30.09 STERILIZATION CONSULT: Primary | ICD-10-CM

## 2021-10-22 PROCEDURE — 3008F BODY MASS INDEX DOCD: CPT | Performed by: UROLOGY

## 2021-10-22 PROCEDURE — 99203 OFFICE O/P NEW LOW 30 MIN: CPT | Performed by: UROLOGY

## 2021-10-22 PROCEDURE — 1036F TOBACCO NON-USER: CPT | Performed by: UROLOGY

## 2021-10-22 RX ORDER — HYDROCODONE BITARTRATE AND ACETAMINOPHEN 5; 325 MG/1; MG/1
TABLET ORAL
Qty: 6 TABLET | Refills: 0 | Status: SHIPPED | OUTPATIENT
Start: 2021-10-22 | End: 2021-12-03

## 2021-10-22 RX ORDER — ALPRAZOLAM 1 MG/1
TABLET ORAL
Qty: 1 TABLET | Refills: 0 | Status: SHIPPED | OUTPATIENT
Start: 2021-10-22 | End: 2021-12-03

## 2021-11-03 ENCOUNTER — RA CDI HCC (OUTPATIENT)
Dept: OTHER | Facility: HOSPITAL | Age: 56
End: 2021-11-03

## 2021-11-11 ENCOUNTER — CLINICAL SUPPORT (OUTPATIENT)
Dept: INTERNAL MEDICINE CLINIC | Facility: CLINIC | Age: 56
End: 2021-11-11
Payer: COMMERCIAL

## 2021-11-11 DIAGNOSIS — Z23 ENCOUNTER FOR IMMUNIZATION: Primary | ICD-10-CM

## 2021-11-11 PROCEDURE — 90750 HZV VACC RECOMBINANT IM: CPT

## 2021-11-11 PROCEDURE — 90471 IMMUNIZATION ADMIN: CPT

## 2021-11-17 ENCOUNTER — PROCEDURE VISIT (OUTPATIENT)
Dept: UROLOGY | Facility: MEDICAL CENTER | Age: 56
End: 2021-11-17
Payer: COMMERCIAL

## 2021-11-17 VITALS
WEIGHT: 223 LBS | HEIGHT: 74 IN | BODY MASS INDEX: 28.62 KG/M2 | DIASTOLIC BLOOD PRESSURE: 84 MMHG | SYSTOLIC BLOOD PRESSURE: 136 MMHG

## 2021-11-17 DIAGNOSIS — Z30.2 ENCOUNTER FOR VASECTOMY: Primary | ICD-10-CM

## 2021-11-17 PROCEDURE — 3008F BODY MASS INDEX DOCD: CPT | Performed by: UROLOGY

## 2021-11-17 PROCEDURE — 88302 TISSUE EXAM BY PATHOLOGIST: CPT | Performed by: PATHOLOGY

## 2021-11-17 PROCEDURE — 55250 REMOVAL OF SPERM DUCT(S): CPT | Performed by: UROLOGY

## 2021-11-29 ENCOUNTER — TELEPHONE (OUTPATIENT)
Dept: INTERNAL MEDICINE CLINIC | Facility: CLINIC | Age: 56
End: 2021-11-29

## 2021-12-01 ENCOUNTER — TELEPHONE (OUTPATIENT)
Dept: UROLOGY | Facility: MEDICAL CENTER | Age: 56
End: 2021-12-01

## 2021-12-01 DIAGNOSIS — Z98.52 S/P VASECTOMY: Primary | ICD-10-CM

## 2021-12-02 ENCOUNTER — CLINICAL SUPPORT (OUTPATIENT)
Dept: UROLOGY | Facility: MEDICAL CENTER | Age: 56
End: 2021-12-02

## 2021-12-02 VITALS
HEIGHT: 74 IN | WEIGHT: 216 LBS | BODY MASS INDEX: 27.72 KG/M2 | HEART RATE: 80 BPM | SYSTOLIC BLOOD PRESSURE: 150 MMHG | DIASTOLIC BLOOD PRESSURE: 82 MMHG

## 2021-12-02 DIAGNOSIS — Z98.52 S/P VASECTOMY: Primary | ICD-10-CM

## 2021-12-02 PROCEDURE — 99024 POSTOP FOLLOW-UP VISIT: CPT

## 2021-12-03 ENCOUNTER — OFFICE VISIT (OUTPATIENT)
Dept: INTERNAL MEDICINE CLINIC | Facility: CLINIC | Age: 56
End: 2021-12-03
Payer: COMMERCIAL

## 2021-12-03 VITALS
WEIGHT: 222.8 LBS | TEMPERATURE: 97.7 F | OXYGEN SATURATION: 97 % | SYSTOLIC BLOOD PRESSURE: 134 MMHG | DIASTOLIC BLOOD PRESSURE: 88 MMHG | BODY MASS INDEX: 28.59 KG/M2 | HEART RATE: 70 BPM | HEIGHT: 74 IN

## 2021-12-03 DIAGNOSIS — E78.2 MIXED HYPERLIPIDEMIA: Primary | ICD-10-CM

## 2021-12-03 DIAGNOSIS — E55.9 VITAMIN D DEFICIENCY: ICD-10-CM

## 2021-12-03 DIAGNOSIS — R73.01 IMPAIRED FASTING BLOOD SUGAR: ICD-10-CM

## 2021-12-03 PROCEDURE — 3725F SCREEN DEPRESSION PERFORMED: CPT | Performed by: INTERNAL MEDICINE

## 2021-12-03 PROCEDURE — 3008F BODY MASS INDEX DOCD: CPT | Performed by: INTERNAL MEDICINE

## 2021-12-03 PROCEDURE — 1036F TOBACCO NON-USER: CPT | Performed by: INTERNAL MEDICINE

## 2021-12-03 PROCEDURE — 99215 OFFICE O/P EST HI 40 MIN: CPT | Performed by: INTERNAL MEDICINE

## 2021-12-03 RX ORDER — PRAVASTATIN SODIUM 20 MG
20 TABLET ORAL
Qty: 30 TABLET | Refills: 5 | Status: SHIPPED | OUTPATIENT
Start: 2021-12-03 | End: 2021-12-28 | Stop reason: SDUPTHER

## 2021-12-28 ENCOUNTER — TELEPHONE (OUTPATIENT)
Dept: INTERNAL MEDICINE CLINIC | Facility: CLINIC | Age: 56
End: 2021-12-28

## 2021-12-28 DIAGNOSIS — E78.2 MIXED HYPERLIPIDEMIA: ICD-10-CM

## 2021-12-28 RX ORDER — PRAVASTATIN SODIUM 20 MG
20 TABLET ORAL
Qty: 30 TABLET | Refills: 5 | Status: SHIPPED | OUTPATIENT
Start: 2021-12-28 | End: 2022-07-12 | Stop reason: SDUPTHER

## 2022-01-27 ENCOUNTER — HOSPITAL ENCOUNTER (OUTPATIENT)
Dept: CT IMAGING | Facility: HOSPITAL | Age: 57
Discharge: HOME | End: 2022-01-27
Attending: INTERNAL MEDICINE
Payer: COMMERCIAL

## 2022-01-27 DIAGNOSIS — E78.2 MIXED HYPERLIPIDEMIA: ICD-10-CM

## 2022-01-27 PROCEDURE — 75571 CT HRT W/O DYE W/CA TEST: CPT

## 2022-01-27 PROCEDURE — G1004 CDSM NDSC: HCPCS

## 2022-02-21 ENCOUNTER — TELEPHONE (OUTPATIENT)
Dept: INTERNAL MEDICINE CLINIC | Facility: CLINIC | Age: 57
End: 2022-02-21

## 2022-06-01 ENCOUNTER — OFFICE VISIT (OUTPATIENT)
Dept: INTERNAL MEDICINE CLINIC | Facility: CLINIC | Age: 57
End: 2022-06-01
Payer: COMMERCIAL

## 2022-06-01 VITALS
HEART RATE: 68 BPM | BODY MASS INDEX: 29.26 KG/M2 | WEIGHT: 228 LBS | DIASTOLIC BLOOD PRESSURE: 80 MMHG | HEIGHT: 74 IN | SYSTOLIC BLOOD PRESSURE: 132 MMHG | TEMPERATURE: 98.2 F | OXYGEN SATURATION: 98 %

## 2022-06-01 DIAGNOSIS — E78.2 MIXED HYPERLIPIDEMIA: ICD-10-CM

## 2022-06-01 DIAGNOSIS — E55.9 VITAMIN D DEFICIENCY: ICD-10-CM

## 2022-06-01 DIAGNOSIS — Z23 ENCOUNTER FOR IMMUNIZATION: ICD-10-CM

## 2022-06-01 DIAGNOSIS — Z00.00 ANNUAL PHYSICAL EXAM: Primary | ICD-10-CM

## 2022-06-01 DIAGNOSIS — R07.89 OTHER CHEST PAIN: ICD-10-CM

## 2022-06-01 PROCEDURE — 90471 IMMUNIZATION ADMIN: CPT

## 2022-06-01 PROCEDURE — 1036F TOBACCO NON-USER: CPT | Performed by: INTERNAL MEDICINE

## 2022-06-01 PROCEDURE — 90715 TDAP VACCINE 7 YRS/> IM: CPT

## 2022-06-01 PROCEDURE — 3008F BODY MASS INDEX DOCD: CPT | Performed by: INTERNAL MEDICINE

## 2022-06-01 PROCEDURE — 99396 PREV VISIT EST AGE 40-64: CPT | Performed by: INTERNAL MEDICINE

## 2022-06-01 PROCEDURE — 93000 ELECTROCARDIOGRAM COMPLETE: CPT | Performed by: INTERNAL MEDICINE

## 2022-06-01 PROCEDURE — 99213 OFFICE O/P EST LOW 20 MIN: CPT | Performed by: INTERNAL MEDICINE

## 2022-06-01 NOTE — PATIENT INSTRUCTIONS

## 2022-06-01 NOTE — PROGRESS NOTES
ADULT ANNUAL [address] [facility] INTERNAL MEDICINE    NAME: Santiago Mcbride  AGE: 55 y o  SEX: male  : 1965     DATE: 2022     Assessment and Plan:     Problem List Items Addressed This Visit    None     Visit Diagnoses     Annual physical exam    -  Primary    Vitamin D deficiency        Mixed hyperlipidemia              Immunizations and preventive care screenings were discussed with patient today  Appropriate education was printed on patient's after visit summary  Counseling:  · Exercise: the importance of regular exercise/physical activity was discussed  Recommend exercise 3-5 times per week for at least 30 minutes  No follow-ups on file  Chief Complaint:     Chief Complaint   Patient presents with    Physical Exam     ANNUAL PHYSICAL       History of Present Illness:     Adult Annual Physical   Patient here for a comprehensive physical exam  The patient reports no problems  Diet and Physical Activity  · Diet/Nutrition: well balanced diet, limited junk food and consuming 3-5 servings of fruits/vegetables daily  · Exercise: walking  Depression Screening  PHQ-2/9 Depression Screening         General Health  · Sleep: sleeps well, gets 4-6 hours of sleep on average and snores loudly  · Hearing: normal - bilateral   · Vision: no vision problems, most recent eye exam >1 year ago, wears glasses and previous LASIK surgery  · Dental: regular dental visits and brushes teeth twice daily   Health  · Symptoms include: none     Review of Systems:     Review of Systems   Constitutional: Negative for appetite change, chills, fatigue and fever  HENT: Negative for sore throat and trouble swallowing  Eyes: Negative for redness  Respiratory: Negative for shortness of breath  Cardiovascular: Negative for chest pain and palpitations  Gastrointestinal: Negative for abdominal pain, constipation and diarrhea     Genitourinary: Negative for dysuria and hematuria  Musculoskeletal: Negative for back pain and neck pain  Skin: Negative for rash  Neurological: Negative for seizures, weakness and headaches  Hematological: Negative for adenopathy  Psychiatric/Behavioral: Negative for confusion  The patient is not nervous/anxious         Past Medical History:     Past Medical History:   Diagnosis Date    COVID-19     No pertinent past medical history       Past Surgical History:     Past Surgical History:   Procedure Laterality Date    COLONOSCOPY  02/10/2017    HERNIA REPAIR      age 8     VASECTOMY        Family History:     Family History   Problem Relation Age of Onset    No Known Problems Mother     Hyperlipidemia Father     COPD Father     No Known Problems Sister     No Known Problems Brother       Social History:     Social History     Socioeconomic History    Marital status: /Civil Union     Spouse name: None    Number of children: None    Years of education: None    Highest education level: None   Occupational History    None   Tobacco Use    Smoking status: Never Smoker    Smokeless tobacco: Never Used   Vaping Use    Vaping Use: Never used   Substance and Sexual Activity    Alcohol use: Yes     Comment: social    Drug use: No    Sexual activity: Not Currently   Other Topics Concern    None   Social History Narrative    Alcohol: No - As per [employer]     Social Determinants of Health     Financial Resource Strain: Not on file   Food Insecurity: Not on file   Transportation Needs: Not on file   Physical Activity: Not on file   Stress: Not on file   Social Connections: Not on file   Intimate Partner Violence: Not on file   Housing Stability: Not on file      Current Medications:     Current Outpatient Medications   Medication Sig Dispense Refill    Coenzyme Q10 (CO Q 10 PO) Take by mouth daily      Cyanocobalamin (VITAMIN B-12 PO) Take by mouth daily      Multiple Vitamin (MULTIVITAMIN) capsule Take 1 capsule by mouth daily      Omega-3 Fatty Acids (FISH OIL) 1,000 mg Take 1,000 mg by mouth daily      pravastatin (PRAVACHOL) 20 mg tablet Take 1 tablet (20 mg total) by mouth daily at bedtime 30 tablet 5    VITAMIN D PO Take by mouth daily       No current facility-administered medications for this visit  Allergies:     No Known Allergies   Physical Exam:     /80 (BP Location: Left arm, Patient Position: Sitting, Cuff Size: Large)   Pulse 68   Temp 98 2 °F (36 8 °C) (Temporal)   Ht 6' 2" (1 88 m)   Wt 103 kg (228 lb)   SpO2 98%   BMI 29 27 kg/m²     Physical Exam  Vitals and nursing note reviewed  Constitutional:       Appearance: He is well-developed  HENT:      Head: Normocephalic and atraumatic  Eyes:      Conjunctiva/sclera: Conjunctivae normal    Cardiovascular:      Rate and Rhythm: Normal rate and regular rhythm  Heart sounds: No murmur heard  Pulmonary:      Effort: Pulmonary effort is normal  No respiratory distress  Breath sounds: Normal breath sounds  Abdominal:      Palpations: Abdomen is soft  Tenderness: There is no abdominal tenderness  Musculoskeletal:      Cervical back: Normal range of motion and neck supple  Skin:     General: Skin is warm and dry  Neurological:      Mental Status: He is alert            Estelita Gandhi MD  [facility] INTERNAL MEDICINE

## 2022-06-01 NOTE — PROGRESS NOTES
Assessment/Plan:           1  Annual physical exam  -     Lipid panel; Future  -     Comprehensive metabolic panel; Future  -     CBC and differential; Future  -     Urinalysis with microscopic  -     TSH, 3rd generation; Future    2  Vitamin D deficiency    3  Mixed hyperlipidemia    4  Other chest pain  Comments:  Pain with inspiration most likely musculoskeletal   EKG normal  Orders:  -     POCT ECG    5  Encounter for immunization  -     TDAP VACCINE GREATER THAN OR EQUAL TO 6YO IM           1  Annual physical exam      2  Vitamin D deficiency      3  Mixed hyperlipidemia      4  Other chest pain    - POCT ECG       No problem-specific Assessment & Plan notes found for this encounter  Subjective:      Patient ID: Yuriy Martinez is a 55 y o  male  HPI    The following portions of the patient's history were reviewed and updated as appropriate: He  has a past medical history of COVID-19 and No pertinent past medical history  He There are no problems to display for this patient  He  has a past surgical history that includes Hernia repair; Colonoscopy (02/10/2017); and Vasectomy  His family history includes COPD in his father; Hyperlipidemia in his father; No Known Problems in his brother, mother, and sister  He  reports that he has never smoked  He has never used smokeless tobacco  He reports current alcohol use  He reports that he does not use drugs  Current Outpatient Medications   Medication Sig Dispense Refill    Coenzyme Q10 (CO Q 10 PO) Take by mouth daily      Cyanocobalamin (VITAMIN B-12 PO) Take by mouth daily      Multiple Vitamin (MULTIVITAMIN) capsule Take 1 capsule by mouth daily      Omega-3 Fatty Acids (FISH OIL) 1,000 mg Take 1,000 mg by mouth daily      pravastatin (PRAVACHOL) 20 mg tablet Take 1 tablet (20 mg total) by mouth daily at bedtime 30 tablet 5    VITAMIN D PO Take by mouth daily       No current facility-administered medications for this visit       Current Outpatient Medications on File Prior to Visit   Medication Sig    Coenzyme Q10 (CO Q 10 PO) Take by mouth daily    Cyanocobalamin (VITAMIN B-12 PO) Take by mouth daily    Multiple Vitamin (MULTIVITAMIN) capsule Take 1 capsule by mouth daily    Omega-3 Fatty Acids (FISH OIL) 1,000 mg Take 1,000 mg by mouth daily    pravastatin (PRAVACHOL) 20 mg tablet Take 1 tablet (20 mg total) by mouth daily at bedtime    VITAMIN D PO Take by mouth daily     No current facility-administered medications on file prior to visit  He has No Known Allergies       Review of Systems   Constitutional: Negative for appetite change, chills, fatigue and fever  HENT: Negative for sore throat and trouble swallowing  Eyes: Negative for redness  Respiratory: Negative for shortness of breath  Cardiovascular: Negative for chest pain and palpitations  Gastrointestinal: Negative for abdominal pain, constipation and diarrhea  Genitourinary: Negative for dysuria and hematuria  Musculoskeletal: Negative for back pain and neck pain  Skin: Negative for rash  Neurological: Negative for seizures, weakness and headaches  Hematological: Negative for adenopathy  Psychiatric/Behavioral: Negative for confusion  The patient is not nervous/anxious  Objective:      /80 (BP Location: Left arm, Patient Position: Sitting, Cuff Size: Large)   Pulse 68   Temp 98 2 °F (36 8 °C) (Temporal)   Ht 6' 2" (1 88 m)   Wt 103 kg (228 lb)   SpO2 98%   BMI 29 27 kg/m²     Results Reviewed     None          No results found for this or any previous visit (from the past 1344 hour(s))  Physical Exam  Constitutional:       General: He is not in acute distress  Appearance: Normal appearance  HENT:      Head: Normocephalic and atraumatic  Nose: Nose normal       Mouth/Throat:      Mouth: Mucous membranes are moist    Eyes:      Extraocular Movements: Extraocular movements intact        Pupils: Pupils are equal, round, and reactive to light  Cardiovascular:      Rate and Rhythm: Normal rate and regular rhythm  Pulses: Normal pulses  Heart sounds: Normal heart sounds  No murmur heard  No friction rub  Pulmonary:      Effort: Pulmonary effort is normal  No respiratory distress  Breath sounds: Normal breath sounds  No wheezing  Abdominal:      General: Abdomen is flat  Bowel sounds are normal  There is no distension  Palpations: Abdomen is soft  There is no mass  Tenderness: There is no abdominal tenderness  There is no guarding  Musculoskeletal:         General: Normal range of motion  Neurological:      General: No focal deficit present  Mental Status: He is alert and oriented to person, place, and time  Mental status is at baseline  Cranial Nerves: No cranial nerve deficit  Psychiatric:         Mood and Affect: Mood normal          Behavior: Behavior normal        BMI Counseling: Body mass index is 29 27 kg/m²  The BMI is above normal  Nutrition recommendations include reducing portion sizes  EKG: normal EKG, normal sinus rhythm

## 2022-07-12 DIAGNOSIS — E78.2 MIXED HYPERLIPIDEMIA: ICD-10-CM

## 2022-07-12 RX ORDER — PRAVASTATIN SODIUM 20 MG
20 TABLET ORAL
Qty: 30 TABLET | Refills: 5 | Status: SHIPPED | OUTPATIENT
Start: 2022-07-12

## 2022-07-13 ENCOUNTER — TELEPHONE (OUTPATIENT)
Dept: INTERNAL MEDICINE CLINIC | Facility: CLINIC | Age: 57
End: 2022-07-13

## 2022-07-13 NOTE — TELEPHONE ENCOUNTER
----- Message from Skip Ramsey MD sent at 7/13/2022 12:18 PM EDT -----  Please tell him the blood work was okay   ----- Message -----  From: Thomas Nayak, Transcription Incoming  Sent: 7/12/2022   2:19 PM EDT  To: Skip Ramsey MD

## 2022-12-07 ENCOUNTER — TELEPHONE (OUTPATIENT)
Dept: INTERNAL MEDICINE CLINIC | Facility: CLINIC | Age: 57
End: 2022-12-07

## 2022-12-07 ENCOUNTER — OFFICE VISIT (OUTPATIENT)
Dept: INTERNAL MEDICINE CLINIC | Facility: CLINIC | Age: 57
End: 2022-12-07

## 2022-12-07 VITALS
HEIGHT: 74 IN | SYSTOLIC BLOOD PRESSURE: 130 MMHG | BODY MASS INDEX: 28.75 KG/M2 | OXYGEN SATURATION: 98 % | HEART RATE: 81 BPM | WEIGHT: 224 LBS | TEMPERATURE: 98.2 F | DIASTOLIC BLOOD PRESSURE: 88 MMHG

## 2022-12-07 DIAGNOSIS — R73.01 IMPAIRED FASTING BLOOD SUGAR: ICD-10-CM

## 2022-12-07 DIAGNOSIS — E78.2 MIXED HYPERLIPIDEMIA: Primary | ICD-10-CM

## 2022-12-07 DIAGNOSIS — Z12.5 PROSTATE CANCER SCREENING: ICD-10-CM

## 2022-12-07 DIAGNOSIS — E55.9 VITAMIN D DEFICIENCY: ICD-10-CM

## 2022-12-07 NOTE — PROGRESS NOTES
Assessment/Plan:           1  Mixed hyperlipidemia  Comments:  Continue pravastatin  Orders:  -     Lipid panel; Future  -     Comprehensive metabolic panel; Future    2  Vitamin D deficiency    3  Impaired fasting blood sugar  Comments:  Low sugar low-carb high-fiber diet recommended  4  Prostate cancer screening  -     PSA, Total Screen; Future         1  Mixed hyperlipidemia      2  Vitamin D deficiency      3  Impaired fasting blood sugar         No problem-specific Assessment & Plan notes found for this encounter  Subjective:      Patient ID: Brigida Fletcher is a 65 y o  male  HPI    The following portions of the patient's history were reviewed and updated as appropriate: He  has a past medical history of COVID-19 and No pertinent past medical history  He There are no problems to display for this patient  He  has a past surgical history that includes Hernia repair; Colonoscopy (02/10/2017); and Vasectomy  His family history includes COPD in his father; Hyperlipidemia in his father; No Known Problems in his brother, mother, and sister  He  reports that he has never smoked  He has never used smokeless tobacco  He reports current alcohol use  He reports that he does not use drugs  Current Outpatient Medications   Medication Sig Dispense Refill   • Coenzyme Q10 (CO Q 10 PO) Take by mouth daily     • Cyanocobalamin (VITAMIN B-12 PO) Take by mouth daily     • Multiple Vitamin (MULTIVITAMIN) capsule Take 1 capsule by mouth daily     • Omega-3 Fatty Acids (FISH OIL) 1,000 mg Take 1,000 mg by mouth daily     • pravastatin (PRAVACHOL) 20 mg tablet Take 1 tablet (20 mg total) by mouth daily at bedtime 30 tablet 5   • VITAMIN D PO Take by mouth daily       No current facility-administered medications for this visit       Current Outpatient Medications on File Prior to Visit   Medication Sig   • Coenzyme Q10 (CO Q 10 PO) Take by mouth daily   • Cyanocobalamin (VITAMIN B-12 PO) Take by mouth daily   • Multiple Vitamin (MULTIVITAMIN) capsule Take 1 capsule by mouth daily   • Omega-3 Fatty Acids (FISH OIL) 1,000 mg Take 1,000 mg by mouth daily   • pravastatin (PRAVACHOL) 20 mg tablet Take 1 tablet (20 mg total) by mouth daily at bedtime   • VITAMIN D PO Take by mouth daily     No current facility-administered medications on file prior to visit  He has No Known Allergies       Review of Systems   Constitutional: Negative for appetite change, chills, fatigue and fever  HENT: Negative for sore throat and trouble swallowing  Eyes: Negative for redness  Respiratory: Negative for shortness of breath  Cardiovascular: Negative for chest pain and palpitations  Gastrointestinal: Negative for abdominal pain, constipation and diarrhea  Genitourinary: Negative for dysuria and hematuria  Musculoskeletal: Negative for back pain and neck pain  Skin: Negative for rash  Neurological: Negative for seizures, weakness and headaches  Hematological: Negative for adenopathy  Psychiatric/Behavioral: Negative for confusion  The patient is not nervous/anxious  Objective:      /88 (BP Location: Left arm, Patient Position: Sitting, Cuff Size: Large)   Pulse 81   Temp 98 2 °F (36 8 °C) (Temporal)   Ht 6' 2" (1 88 m)   Wt 102 kg (224 lb)   SpO2 98%   BMI 28 76 kg/m²     Results Reviewed     None          No results found for this or any previous visit (from the past 1344 hour(s))  Physical Exam  Constitutional:       General: He is not in acute distress  Appearance: Normal appearance  HENT:      Head: Normocephalic and atraumatic  Right Ear: Tympanic membrane normal       Left Ear: Tympanic membrane normal       Nose: Nose normal       Mouth/Throat:      Mouth: Mucous membranes are moist    Eyes:      Extraocular Movements: Extraocular movements intact  Pupils: Pupils are equal, round, and reactive to light  Cardiovascular:      Rate and Rhythm: Normal rate and regular rhythm  Pulses: Normal pulses  Heart sounds: Normal heart sounds  No murmur heard  No friction rub  Pulmonary:      Effort: Pulmonary effort is normal  No respiratory distress  Breath sounds: Normal breath sounds  No wheezing  Abdominal:      General: Abdomen is flat  Bowel sounds are normal  There is no distension  Palpations: Abdomen is soft  There is no mass  Tenderness: There is no abdominal tenderness  There is no guarding  Hernia: A hernia is present  Genitourinary:     Prostate: Normal       Rectum: Guaiac result negative  Musculoskeletal:         General: Normal range of motion  Cervical back: Normal range of motion  Skin:     General: Skin is warm  Neurological:      General: No focal deficit present  Mental Status: He is alert and oriented to person, place, and time  Mental status is at baseline  Cranial Nerves: No cranial nerve deficit     Psychiatric:         Mood and Affect: Mood normal          Behavior: Behavior normal

## 2022-12-07 NOTE — TELEPHONE ENCOUNTER
Patient was looking for blood work slip when he left  Said you were going to order a PSA  Email or mail request to him

## 2023-01-31 DIAGNOSIS — E78.2 MIXED HYPERLIPIDEMIA: ICD-10-CM

## 2023-01-31 RX ORDER — PRAVASTATIN SODIUM 20 MG
20 TABLET ORAL
Qty: 30 TABLET | Refills: 5 | Status: SHIPPED | OUTPATIENT
Start: 2023-01-31

## 2023-06-14 ENCOUNTER — OFFICE VISIT (OUTPATIENT)
Dept: INTERNAL MEDICINE CLINIC | Facility: CLINIC | Age: 58
End: 2023-06-14
Payer: COMMERCIAL

## 2023-06-14 VITALS
OXYGEN SATURATION: 97 % | SYSTOLIC BLOOD PRESSURE: 143 MMHG | HEIGHT: 74 IN | DIASTOLIC BLOOD PRESSURE: 86 MMHG | BODY MASS INDEX: 28.49 KG/M2 | TEMPERATURE: 98.1 F | HEART RATE: 81 BPM | WEIGHT: 222 LBS

## 2023-06-14 DIAGNOSIS — Z00.00 ANNUAL PHYSICAL EXAM: Primary | ICD-10-CM

## 2023-06-14 DIAGNOSIS — E78.2 MIXED HYPERLIPIDEMIA: ICD-10-CM

## 2023-06-14 DIAGNOSIS — Z12.5 PROSTATE CANCER SCREENING: ICD-10-CM

## 2023-06-14 PROCEDURE — 99213 OFFICE O/P EST LOW 20 MIN: CPT | Performed by: INTERNAL MEDICINE

## 2023-06-14 PROCEDURE — 99396 PREV VISIT EST AGE 40-64: CPT | Performed by: INTERNAL MEDICINE

## 2023-06-14 RX ORDER — PRAVASTATIN SODIUM 20 MG
20 TABLET ORAL
Qty: 30 TABLET | Refills: 5
Start: 2023-06-14

## 2023-06-14 NOTE — PROGRESS NOTES
ADULT ANNUAL [address] [facility] INTERNAL MEDICINE    NAME: Lary Pereira  AGE: 63 y o  SEX: male  : 1965     DATE: 2023     Assessment and Plan:     Problem List Items Addressed This Visit    None  Visit Diagnoses     Annual physical exam    -  Primary    Relevant Orders    CBC and differential    Comprehensive metabolic panel    Lipid panel    Urinalysis with microscopic    Prostate cancer screening        Relevant Orders    PSA, Total Screen    Mixed hyperlipidemia        Continue pravastatin 20 mg daily  Relevant Medications    pravastatin (PRAVACHOL) 20 mg tablet          Immunizations and preventive care screenings were discussed with patient today  Appropriate education was printed on patient's after visit summary  Discussed risks and benefits of prostate cancer screening  We discussed the controversial history of PSA screening for prostate cancer in the [place] as well as the risk of over detection and over treatment of prostate cancer by way of PSA screening  The patient understands that PSA blood testing is an imperfect way to screen for prostate cancer and that elevated PSA levels in the blood may also be caused by infection, inflammation, prostatic trauma or manipulation, urological procedures, or by benign prostatic enlargement  The role of the digital rectal examination in prostate cancer screening was also discussed and I discussed with him that there is large interobserver variability in the findings of digital rectal examination  Counseling:  Exercise: the importance of regular exercise/physical activity was discussed  Recommend exercise 3-5 times per week for at least 30 minutes  Return in about 6 months (around 2023)       Chief Complaint:     Chief Complaint   Patient presents with   • Follow-up     6 month follow up well check    • Physical Exam      History of Present Illness:     Adult Annual Physical   Patient here for a comprehensive physical exam  The patient reports no problems  Diet and Physical Activity  Diet/Nutrition: well balanced diet and consuming 3-5 servings of fruits/vegetables daily  Exercise: no formal exercise  Depression Screening  PHQ-2/9 Depression Screening    Little interest or pleasure in doing things: 0 - not at all  Feeling down, depressed, or hopeless: 0 - not at all       General Health  Sleep: sleeps well and gets 7-8 hours of sleep on average  Hearing: normal - bilateral   Vision: no vision problems, most recent eye exam >1 year ago and previous LASIK surgery  Dental: regular dental visits and brushes teeth twice daily   Health  Symptoms include: none     Review of Systems:     Review of Systems   Constitutional: Negative for appetite change, chills, fatigue and fever  HENT: Negative for sore throat and trouble swallowing  Eyes: Negative for redness  Respiratory: Negative for shortness of breath  Cardiovascular: Negative for chest pain and palpitations  Gastrointestinal: Negative for abdominal pain, constipation and diarrhea  Genitourinary: Negative for dysuria and hematuria  Musculoskeletal: Negative for back pain and neck pain  Skin: Negative for rash  Neurological: Negative for seizures, weakness and headaches  Hematological: Negative for adenopathy  Psychiatric/Behavioral: Negative for confusion  The patient is not nervous/anxious         Past Medical History:     Past Medical History:   Diagnosis Date   • COVID-19    • No pertinent past medical history       Past Surgical History:     Past Surgical History:   Procedure Laterality Date   • COLONOSCOPY  02/10/2017   • HERNIA REPAIR      age 12    • VASECTOMY        Family History:     Family History   Problem Relation Age of Onset   • No Known Problems Mother    • Hyperlipidemia Father    • COPD Father    • No Known Problems Sister    • No Known Problems Brother       Social "History:     Social History     Socioeconomic History   • Marital status: /Civil Union     Spouse name: None   • Number of children: None   • Years of education: None   • Highest education level: None   Occupational History   • None   Tobacco Use   • Smoking status: Never   • Smokeless tobacco: Never   Vaping Use   • Vaping Use: Never used   Substance and Sexual Activity   • Alcohol use: Yes     Comment: social   • Drug use: No   • Sexual activity: Not Currently   Other Topics Concern   • None   Social History Narrative    Alcohol: No - As per [employer]     Social Determinants of Health     Financial Resource Strain: Not on file   Food Insecurity: Not on file   Transportation Needs: Not on file   Physical Activity: Not on file   Stress: Not on file   Social Connections: Not on file   Intimate Partner Violence: Not on file   Housing Stability: Not on file      Current Medications:     Current Outpatient Medications   Medication Sig Dispense Refill   • Coenzyme Q10 (CO Q 10 PO) Take by mouth daily     • Cyanocobalamin (VITAMIN B-12 PO) Take by mouth daily     • Multiple Vitamin (MULTIVITAMIN) capsule Take 1 capsule by mouth daily     • Omega-3 Fatty Acids (FISH OIL) 1,000 mg Take 1,000 mg by mouth daily     • pravastatin (PRAVACHOL) 20 mg tablet Take 1 tablet (20 mg total) by mouth daily at bedtime 30 tablet 5   • VITAMIN D PO Take by mouth daily       No current facility-administered medications for this visit  Allergies:     No Known Allergies   Physical Exam:     /86 (BP Location: Left arm, Patient Position: Sitting, Cuff Size: Large)   Pulse 81   Temp 98 1 °F (36 7 °C) (Temporal)   Ht 6' 2\" (1 88 m)   Wt 101 kg (222 lb)   SpO2 97%   BMI 28 50 kg/m²     Physical Exam  Vitals and nursing note reviewed  Constitutional:       General: He is not in acute distress  Appearance: He is well-developed  HENT:      Head: Normocephalic and atraumatic        Right Ear: Tympanic membrane " normal       Mouth/Throat:      Mouth: Mucous membranes are moist    Eyes:      Conjunctiva/sclera: Conjunctivae normal    Cardiovascular:      Rate and Rhythm: Normal rate and regular rhythm  Heart sounds: No murmur heard  Pulmonary:      Effort: Pulmonary effort is normal  No respiratory distress  Breath sounds: Normal breath sounds  Abdominal:      General: Abdomen is flat  Palpations: Abdomen is soft  Tenderness: There is no abdominal tenderness  Musculoskeletal:         General: No swelling  Cervical back: Normal range of motion and neck supple  Skin:     General: Skin is warm and dry  Capillary Refill: Capillary refill takes less than 2 seconds  Neurological:      General: No focal deficit present  Mental Status: He is alert  Psychiatric:         Mood and Affect: Mood normal       BMI Counseling: Body mass index is 28 5 kg/m²  The BMI is above normal  Nutrition recommendations include reducing portion sizes      Joann Hodge MD  [facility] INTERNAL MEDICINE

## 2023-06-15 NOTE — PROGRESS NOTES
Assessment/Plan:           1  Annual physical exam  -     CBC and differential; Future  -     Comprehensive metabolic panel; Future  -     Lipid panel; Future  -     Urinalysis with microscopic    2  Prostate cancer screening  -     PSA, Total Screen; Future    3  Mixed hyperlipidemia  Comments:  Continue pravastatin 20 mg daily  Orders:  -     pravastatin (PRAVACHOL) 20 mg tablet; Take 1 tablet (20 mg total) by mouth daily at bedtime           1  Annual physical exam    - CBC and differential; Future  - Comprehensive metabolic panel; Future  - Lipid panel; Future  - Urinalysis with microscopic    2  Prostate cancer screening    - PSA, Total Screen; Future    3  Mixed hyperlipidemia    - pravastatin (PRAVACHOL) 20 mg tablet; Take 1 tablet (20 mg total) by mouth daily at bedtime  Dispense: 30 tablet; Refill: 5       No problem-specific Assessment & Plan notes found for this encounter  Subjective:      Patient ID: Kiersten Stern is a 63 y o  male  HPI    The following portions of the patient's history were reviewed and updated as appropriate: He  has a past medical history of COVID-19 and No pertinent past medical history  He There are no problems to display for this patient  He  has a past surgical history that includes Hernia repair; Colonoscopy (02/10/2017); and Vasectomy  His family history includes COPD in his father; Hyperlipidemia in his father; No Known Problems in his brother, mother, and sister  He  reports that he has never smoked  He has never used smokeless tobacco  He reports current alcohol use  He reports that he does not use drugs    Current Outpatient Medications   Medication Sig Dispense Refill   • Coenzyme Q10 (CO Q 10 PO) Take by mouth daily     • Cyanocobalamin (VITAMIN B-12 PO) Take by mouth daily     • Multiple Vitamin (MULTIVITAMIN) capsule Take 1 capsule by mouth daily     • Omega-3 Fatty Acids (FISH OIL) 1,000 mg Take 1,000 mg by mouth daily     • pravastatin (PRAVACHOL) 20 mg "tablet Take 1 tablet (20 mg total) by mouth daily at bedtime 30 tablet 5   • VITAMIN D PO Take by mouth daily       No current facility-administered medications for this visit  Current Outpatient Medications on File Prior to Visit   Medication Sig   • Coenzyme Q10 (CO Q 10 PO) Take by mouth daily   • Cyanocobalamin (VITAMIN B-12 PO) Take by mouth daily   • Multiple Vitamin (MULTIVITAMIN) capsule Take 1 capsule by mouth daily   • Omega-3 Fatty Acids (FISH OIL) 1,000 mg Take 1,000 mg by mouth daily   • VITAMIN D PO Take by mouth daily   • [DISCONTINUED] pravastatin (PRAVACHOL) 20 mg tablet Take 1 tablet (20 mg total) by mouth daily at bedtime     No current facility-administered medications on file prior to visit  He has No Known Allergies       Review of Systems   Constitutional: Negative for appetite change, chills, fatigue and fever  HENT: Negative for sore throat and trouble swallowing  Eyes: Negative for redness  Respiratory: Negative for shortness of breath  Cardiovascular: Negative for chest pain and palpitations  Gastrointestinal: Negative for abdominal pain, constipation and diarrhea  Genitourinary: Negative for dysuria and hematuria  Musculoskeletal: Negative for back pain and neck pain  Skin: Negative for rash  Neurological: Negative for seizures, weakness and headaches  Hematological: Negative for adenopathy  Psychiatric/Behavioral: Negative for confusion  The patient is not nervous/anxious  Objective:      /86 (BP Location: Left arm, Patient Position: Sitting, Cuff Size: Large)   Pulse 81   Temp 98 1 °F (36 7 °C) (Temporal)   Ht 6' 2\" (1 88 m)   Wt 101 kg (222 lb)   SpO2 97%   BMI 28 50 kg/m²     Results Reviewed     None          No results found for this or any previous visit (from the past 1344 hour(s))  Physical Exam  Constitutional:       General: He is not in acute distress  Appearance: Normal appearance     HENT:      Head: Normocephalic " and atraumatic  Right Ear: Tympanic membrane normal       Left Ear: Tympanic membrane normal       Nose: Nose normal       Mouth/Throat:      Mouth: Mucous membranes are moist    Eyes:      Extraocular Movements: Extraocular movements intact  Pupils: Pupils are equal, round, and reactive to light  Cardiovascular:      Rate and Rhythm: Normal rate and regular rhythm  Pulses: Normal pulses  Heart sounds: Normal heart sounds  No murmur heard  No friction rub  Pulmonary:      Effort: Pulmonary effort is normal  No respiratory distress  Breath sounds: Normal breath sounds  No wheezing  Abdominal:      General: Abdomen is flat  Bowel sounds are normal  There is no distension  Palpations: Abdomen is soft  There is no mass  Tenderness: There is no abdominal tenderness  There is no guarding  Musculoskeletal:         General: Normal range of motion  Cervical back: Normal range of motion  Skin:     General: Skin is warm  Neurological:      General: No focal deficit present  Mental Status: He is alert and oriented to person, place, and time  Mental status is at baseline  Cranial Nerves: No cranial nerve deficit  Psychiatric:         Mood and Affect: Mood normal          Behavior: Behavior normal        BMI Counseling: Body mass index is 28 5 kg/m²  The BMI is above normal  Nutrition recommendations include reducing portion sizes

## 2023-07-12 ENCOUNTER — TELEPHONE (OUTPATIENT)
Dept: INTERNAL MEDICINE CLINIC | Facility: CLINIC | Age: 58
End: 2023-07-12
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
## 2023-07-12 RX ORDER — DOXYCYCLINE HYCLATE 100 MG/1
100 CAPSULE ORAL EVERY 12 HOURS SCHEDULED
Qty: 4 CAPSULE | Refills: 0 | Status: SHIPPED | OUTPATIENT
Start: 2023-07-12 | End: 2023-07-14

## 2023-07-12 NOTE — TELEPHONE ENCOUNTER
patient called and stated he got bit by a tick and does not know if he should be on antibiotics or not could you give him a call or let him know what do to

## 2023-08-14 ENCOUNTER — TELEPHONE (OUTPATIENT)
Dept: INTERNAL MEDICINE CLINIC | Facility: CLINIC | Age: 58
End: 2023-08-14

## 2023-08-14 DIAGNOSIS — E78.2 MIXED HYPERLIPIDEMIA: ICD-10-CM

## 2023-08-14 RX ORDER — PRAVASTATIN SODIUM 20 MG
20 TABLET ORAL
Qty: 30 TABLET | Refills: 1 | Status: SHIPPED | OUTPATIENT
Start: 2023-08-14

## 2023-10-26 DIAGNOSIS — E78.2 MIXED HYPERLIPIDEMIA: ICD-10-CM

## 2023-10-26 RX ORDER — PRAVASTATIN SODIUM 20 MG
20 TABLET ORAL
Qty: 30 TABLET | Refills: 5 | Status: SHIPPED | OUTPATIENT
Start: 2023-10-26

## 2023-11-22 ENCOUNTER — OFFICE VISIT (OUTPATIENT)
Dept: INTERNAL MEDICINE CLINIC | Facility: CLINIC | Age: 58
End: 2023-11-22
Payer: COMMERCIAL

## 2023-11-22 VITALS
DIASTOLIC BLOOD PRESSURE: 92 MMHG | HEART RATE: 75 BPM | SYSTOLIC BLOOD PRESSURE: 137 MMHG | TEMPERATURE: 98 F | WEIGHT: 228 LBS | HEIGHT: 74 IN | OXYGEN SATURATION: 99 % | BODY MASS INDEX: 29.26 KG/M2

## 2023-11-22 DIAGNOSIS — J45.909 MILD ASTHMA WITHOUT COMPLICATION, UNSPECIFIED WHETHER PERSISTENT: ICD-10-CM

## 2023-11-22 DIAGNOSIS — R05.2 SUBACUTE COUGH: Primary | ICD-10-CM

## 2023-11-22 PROCEDURE — 99213 OFFICE O/P EST LOW 20 MIN: CPT | Performed by: INTERNAL MEDICINE

## 2023-11-22 RX ORDER — ALBUTEROL SULFATE 90 UG/1
2 AEROSOL, METERED RESPIRATORY (INHALATION) EVERY 6 HOURS PRN
Qty: 18 G | Refills: 2 | Status: SHIPPED | OUTPATIENT
Start: 2023-11-22

## 2023-11-22 RX ORDER — AZITHROMYCIN 250 MG/1
TABLET, FILM COATED ORAL
Qty: 6 TABLET | Refills: 0 | Status: SHIPPED | OUTPATIENT
Start: 2023-11-22 | End: 2023-11-26

## 2023-11-22 RX ORDER — CETIRIZINE HYDROCHLORIDE 10 MG/1
10 TABLET, CHEWABLE ORAL DAILY
Qty: 90 TABLET | Refills: 3 | Status: SHIPPED | OUTPATIENT
Start: 2023-11-22 | End: 2024-01-03 | Stop reason: SDUPTHER

## 2023-11-22 NOTE — PROGRESS NOTES
Assessment/Plan:      Depression Screening and Follow-up Plan: Patient was screened for depression during today's encounter. They screened negative with a PHQ-2 score of 0.          1. Subacute cough  Comments:  Banner Spray recommended.  Orders:  -     XR chest pa & lateral; Future; Expected date: 11/22/2023  -     sodium chloride (OCEAN) 0.65 % nasal spray; 1 spray into each nostril 4 (four) times a day  -     azithromycin (ZITHROMAX) 250 mg tablet; Take 2 tablets today then 1 tablet daily x 4 days    2. Mild asthma without complication, unspecified whether persistent  Comments:  Cetirizine recommended.  Orders:  -     albuterol (ProAir HFA) 90 mcg/act inhaler; Inhale 2 puffs every 6 (six) hours as needed for wheezing           1. Subacute cough    - XR chest pa & lateral; Future    2. Mild asthma without complication, unspecified whether persistent         No problem-specific Assessment & Plan notes found for this encounter.           Subjective:      Patient ID: Odilon Hess is a 58 y.o. male.    HPI    The following portions of the patient's history were reviewed and updated as appropriate: He  has a past medical history of COVID-19 and No pertinent past medical history.  He There are no problems to display for this patient.    He  has a past surgical history that includes Hernia repair; Colonoscopy (02/10/2017); and Vasectomy.  His family history includes COPD in his father; Hyperlipidemia in his father; No Known Problems in his brother, mother, and sister.  He  reports that he has never smoked. He has never used smokeless tobacco. He reports current alcohol use. He reports that he does not use drugs.  Current Outpatient Medications   Medication Sig Dispense Refill    albuterol (ProAir HFA) 90 mcg/act inhaler Inhale 2 puffs every 6 (six) hours as needed for wheezing 18 g 2    Coenzyme Q10 (CO Q 10 PO) Take by mouth daily      Cyanocobalamin (VITAMIN B-12 PO) Take by mouth daily      Multiple Vitamin  (MULTIVITAMIN) capsule Take 1 capsule by mouth daily      Omega-3 Fatty Acids (FISH OIL) 1,000 mg Take 1,000 mg by mouth daily      pravastatin (PRAVACHOL) 20 mg tablet Take 1 tablet (20 mg total) by mouth daily at bedtime 30 tablet 5    sodium chloride (OCEAN) 0.65 % nasal spray 1 spray into each nostril 4 (four) times a day 60 mL 0    VITAMIN D PO Take by mouth daily      cetirizine (ZyrTEC) 10 MG chewable tablet Chew 1 tablet (10 mg total) daily 90 tablet 3    montelukast (SINGULAIR) 10 mg tablet Take 1 tablet (10 mg total) by mouth daily at bedtime 30 tablet 2     No current facility-administered medications for this visit.     Current Outpatient Medications on File Prior to Visit   Medication Sig    Coenzyme Q10 (CO Q 10 PO) Take by mouth daily    Cyanocobalamin (VITAMIN B-12 PO) Take by mouth daily    Multiple Vitamin (MULTIVITAMIN) capsule Take 1 capsule by mouth daily    Omega-3 Fatty Acids (FISH OIL) 1,000 mg Take 1,000 mg by mouth daily    pravastatin (PRAVACHOL) 20 mg tablet Take 1 tablet (20 mg total) by mouth daily at bedtime    VITAMIN D PO Take by mouth daily     No current facility-administered medications on file prior to visit.     There are no discontinued medications.   He has No Known Allergies..    Review of Systems   Constitutional:  Negative for appetite change, chills, fatigue and fever.   HENT:  Negative for sore throat and trouble swallowing.    Eyes:  Negative for redness.   Respiratory:  Positive for cough. Negative for shortness of breath.    Cardiovascular:  Negative for chest pain and palpitations.   Gastrointestinal:  Negative for abdominal pain, constipation and diarrhea.   Genitourinary:  Negative for dysuria and hematuria.   Musculoskeletal:  Negative for back pain and neck pain.   Skin:  Negative for rash.   Neurological:  Negative for seizures, weakness and headaches.   Hematological:  Negative for adenopathy.   Psychiatric/Behavioral:  Negative for confusion. The patient is not  "nervous/anxious.          Objective:      /92 (BP Location: Left arm, Patient Position: Sitting, Cuff Size: Standard)   Pulse 75   Temp 98 °F (36.7 °C) (Temporal)   Ht 6' 2\" (1.88 m)   Wt 103 kg (228 lb)   SpO2 99%   BMI 29.27 kg/m²     Results Reviewed       None            No results found for this or any previous visit (from the past 1344 hour(s)).     Physical Exam  Constitutional:       General: He is not in acute distress.     Appearance: Normal appearance.   HENT:      Head: Normocephalic and atraumatic.      Nose: Nose normal.      Mouth/Throat:      Mouth: Mucous membranes are moist.   Eyes:      Extraocular Movements: Extraocular movements intact.      Pupils: Pupils are equal, round, and reactive to light.   Cardiovascular:      Rate and Rhythm: Normal rate and regular rhythm.      Pulses: Normal pulses.      Heart sounds: Normal heart sounds. No murmur heard.     No friction rub.   Pulmonary:      Effort: Pulmonary effort is normal. No respiratory distress.      Breath sounds: Wheezing present.   Abdominal:      General: Abdomen is flat. Bowel sounds are normal. There is no distension.      Palpations: Abdomen is soft. There is no mass.      Tenderness: There is no abdominal tenderness. There is no guarding.   Musculoskeletal:         General: Normal range of motion.   Neurological:      General: No focal deficit present.      Mental Status: He is alert and oriented to person, place, and time. Mental status is at baseline.      Cranial Nerves: No cranial nerve deficit.   Psychiatric:         Mood and Affect: Mood normal.         Behavior: Behavior normal.         "

## 2024-01-02 ENCOUNTER — APPOINTMENT (OUTPATIENT)
Dept: RADIOLOGY | Age: 59
End: 2024-01-02
Payer: COMMERCIAL

## 2024-01-02 DIAGNOSIS — R05.2 SUBACUTE COUGH: ICD-10-CM

## 2024-01-02 PROCEDURE — 71046 X-RAY EXAM CHEST 2 VIEWS: CPT

## 2024-01-03 ENCOUNTER — OFFICE VISIT (OUTPATIENT)
Dept: INTERNAL MEDICINE CLINIC | Facility: CLINIC | Age: 59
End: 2024-01-03
Payer: COMMERCIAL

## 2024-01-03 VITALS
SYSTOLIC BLOOD PRESSURE: 132 MMHG | DIASTOLIC BLOOD PRESSURE: 84 MMHG | HEIGHT: 74 IN | TEMPERATURE: 97.3 F | HEART RATE: 73 BPM | BODY MASS INDEX: 28.84 KG/M2 | OXYGEN SATURATION: 98 % | WEIGHT: 224.7 LBS

## 2024-01-03 DIAGNOSIS — J45.20 MILD INTERMITTENT ASTHMA WITHOUT COMPLICATION: ICD-10-CM

## 2024-01-03 DIAGNOSIS — R05.3 CHRONIC COUGH: Primary | ICD-10-CM

## 2024-01-03 PROCEDURE — 99214 OFFICE O/P EST MOD 30 MIN: CPT | Performed by: INTERNAL MEDICINE

## 2024-01-03 RX ORDER — CETIRIZINE HYDROCHLORIDE 10 MG/1
10 TABLET, CHEWABLE ORAL DAILY
Qty: 90 TABLET | Refills: 3 | Status: SHIPPED | OUTPATIENT
Start: 2024-01-03

## 2024-01-03 RX ORDER — MONTELUKAST SODIUM 10 MG/1
10 TABLET ORAL
Qty: 30 TABLET | Refills: 2 | Status: SHIPPED | OUTPATIENT
Start: 2024-01-03

## 2024-01-03 NOTE — PROGRESS NOTES
Assessment/Plan:           1. Chronic cough  Comments:  Possible viral infection.   Hydration and vitamin C vitamin D and zinc advised.    2. Mild intermittent asthma without complication  Comments:  Start Zyrtec 10 mg daily and montelukast 10 mg in the evening.  Continue inhaler.  Orders:  -     cetirizine (ZyrTEC) 10 MG chewable tablet; Chew 1 tablet (10 mg total) daily  -     montelukast (SINGULAIR) 10 mg tablet; Take 1 tablet (10 mg total) by mouth daily at bedtime           1. Chronic cough      2. Mild intermittent asthma without complication         No problem-specific Assessment & Plan notes found for this encounter.           Subjective:      Patient ID: Odilon Hess is a 58 y.o. male.    HPI    The following portions of the patient's history were reviewed and updated as appropriate: He  has a past medical history of COVID-19 and No pertinent past medical history.  He There are no problems to display for this patient.    He  has a past surgical history that includes Hernia repair; Colonoscopy (02/10/2017); and Vasectomy.  His family history includes COPD in his father; Hyperlipidemia in his father; No Known Problems in his brother, mother, and sister.  He  reports that he has never smoked. He has never used smokeless tobacco. He reports current alcohol use. He reports that he does not use drugs.  Current Outpatient Medications   Medication Sig Dispense Refill    albuterol (ProAir HFA) 90 mcg/act inhaler Inhale 2 puffs every 6 (six) hours as needed for wheezing 18 g 2    cetirizine (ZyrTEC) 10 MG chewable tablet Chew 1 tablet (10 mg total) daily 90 tablet 3    Coenzyme Q10 (CO Q 10 PO) Take by mouth daily      Cyanocobalamin (VITAMIN B-12 PO) Take by mouth daily      montelukast (SINGULAIR) 10 mg tablet Take 1 tablet (10 mg total) by mouth daily at bedtime 30 tablet 2    Multiple Vitamin (MULTIVITAMIN) capsule Take 1 capsule by mouth daily      Omega-3 Fatty Acids (FISH OIL) 1,000 mg Take 1,000 mg by mouth  daily      pravastatin (PRAVACHOL) 20 mg tablet Take 1 tablet (20 mg total) by mouth daily at bedtime 30 tablet 5    sodium chloride (OCEAN) 0.65 % nasal spray 1 spray into each nostril 4 (four) times a day 60 mL 0    VITAMIN D PO Take by mouth daily       No current facility-administered medications for this visit.     Current Outpatient Medications on File Prior to Visit   Medication Sig    albuterol (ProAir HFA) 90 mcg/act inhaler Inhale 2 puffs every 6 (six) hours as needed for wheezing    Coenzyme Q10 (CO Q 10 PO) Take by mouth daily    Cyanocobalamin (VITAMIN B-12 PO) Take by mouth daily    Multiple Vitamin (MULTIVITAMIN) capsule Take 1 capsule by mouth daily    Omega-3 Fatty Acids (FISH OIL) 1,000 mg Take 1,000 mg by mouth daily    pravastatin (PRAVACHOL) 20 mg tablet Take 1 tablet (20 mg total) by mouth daily at bedtime    sodium chloride (OCEAN) 0.65 % nasal spray 1 spray into each nostril 4 (four) times a day    VITAMIN D PO Take by mouth daily    [DISCONTINUED] cetirizine (ZyrTEC) 10 MG chewable tablet Chew 1 tablet (10 mg total) daily     No current facility-administered medications on file prior to visit.     Medications Discontinued During This Encounter   Medication Reason    cetirizine (ZyrTEC) 10 MG chewable tablet Reorder      He has No Known Allergies..    Review of Systems   Constitutional:  Negative for appetite change, chills, fatigue and fever.   HENT:  Positive for congestion. Negative for sore throat and trouble swallowing.    Eyes:  Negative for redness.   Respiratory:  Positive for cough. Negative for shortness of breath.    Cardiovascular:  Negative for chest pain and palpitations.   Gastrointestinal:  Negative for abdominal pain, constipation and diarrhea.   Genitourinary:  Negative for dysuria and hematuria.   Musculoskeletal:  Negative for back pain and neck pain.   Skin:  Negative for rash.   Neurological:  Negative for seizures, weakness and headaches.   Hematological:  Negative for  "adenopathy.   Psychiatric/Behavioral:  Negative for confusion. The patient is not nervous/anxious.          Objective:      /84 (BP Location: Left arm, Patient Position: Sitting, Cuff Size: Standard)   Pulse 73   Temp (!) 97.3 °F (36.3 °C) (Temporal)   Ht 6' 2\" (1.88 m)   Wt 102 kg (224 lb 11.2 oz)   SpO2 98%   BMI 28.85 kg/m²     Results Reviewed       None            No results found for this or any previous visit (from the past 1344 hour(s)).     Physical Exam  Constitutional:       General: He is not in acute distress.     Appearance: Normal appearance.   HENT:      Head: Normocephalic and atraumatic.      Nose: Nose normal.      Mouth/Throat:      Mouth: Mucous membranes are moist.   Eyes:      Extraocular Movements: Extraocular movements intact.      Pupils: Pupils are equal, round, and reactive to light.   Cardiovascular:      Rate and Rhythm: Normal rate and regular rhythm.      Pulses: Normal pulses.      Heart sounds: Normal heart sounds. No murmur heard.     No friction rub.   Pulmonary:      Effort: Pulmonary effort is normal. No respiratory distress.      Breath sounds: Wheezing present.   Abdominal:      General: Abdomen is flat. Bowel sounds are normal. There is no distension.      Palpations: Abdomen is soft. There is no mass.      Tenderness: There is no abdominal tenderness. There is no guarding.   Musculoskeletal:         General: Normal range of motion.   Neurological:      General: No focal deficit present.      Mental Status: He is alert and oriented to person, place, and time. Mental status is at baseline.      Cranial Nerves: No cranial nerve deficit.   Psychiatric:         Mood and Affect: Mood normal.         Behavior: Behavior normal.         "

## 2024-02-14 ENCOUNTER — OFFICE VISIT (OUTPATIENT)
Dept: INTERNAL MEDICINE CLINIC | Facility: CLINIC | Age: 59
End: 2024-02-14
Payer: COMMERCIAL

## 2024-02-14 VITALS
BODY MASS INDEX: 28.75 KG/M2 | OXYGEN SATURATION: 99 % | HEIGHT: 74 IN | DIASTOLIC BLOOD PRESSURE: 80 MMHG | WEIGHT: 224 LBS | SYSTOLIC BLOOD PRESSURE: 140 MMHG | TEMPERATURE: 97.6 F | HEART RATE: 69 BPM

## 2024-02-14 DIAGNOSIS — J45.20 MILD INTERMITTENT ASTHMA WITHOUT COMPLICATION: ICD-10-CM

## 2024-02-14 DIAGNOSIS — I10 HYPERTENSION, UNSPECIFIED TYPE: Primary | ICD-10-CM

## 2024-02-14 PROCEDURE — 99214 OFFICE O/P EST MOD 30 MIN: CPT | Performed by: INTERNAL MEDICINE

## 2024-02-14 RX ORDER — CETIRIZINE HYDROCHLORIDE 10 MG/1
10 TABLET, CHEWABLE ORAL DAILY
Start: 2024-02-14

## 2024-02-14 RX ORDER — MONTELUKAST SODIUM 10 MG/1
10 TABLET ORAL
Start: 2024-02-14

## 2024-02-14 NOTE — PROGRESS NOTES
Assessment/Plan:           1. Hypertension, unspecified type  Comments:  Home monitoring was advised.    2. Mild intermittent asthma without complication  Comments:  Had improved with Zyrtec 10 mg daily and montelukast 10 mg in the evening.  Continue inhaler.  Orders:  -     cetirizine (ZyrTEC) 10 MG chewable tablet; Chew 1 tablet (10 mg total) daily  -     montelukast (SINGULAIR) 10 mg tablet; Take 1 tablet (10 mg total) by mouth daily at bedtime  -     Complete PFT with post bronchodilator; Future           1. Mild intermittent asthma without complication    - cetirizine (ZyrTEC) 10 MG chewable tablet; Chew 1 tablet (10 mg total) daily  - montelukast (SINGULAIR) 10 mg tablet; Take 1 tablet (10 mg total) by mouth daily at bedtime  - Complete PFT with post bronchodilator; Future       No problem-specific Assessment & Plan notes found for this encounter.           Subjective:      Patient ID: Odilon Hess is a 58 y.o. male.    HPI    The following portions of the patient's history were reviewed and updated as appropriate: He  has a past medical history of COVID-19 and No pertinent past medical history.  He There are no problems to display for this patient.    He  has a past surgical history that includes Hernia repair; Colonoscopy (02/10/2017); and Vasectomy.  His family history includes COPD in his father; Hyperlipidemia in his father; No Known Problems in his brother, mother, and sister.  He  reports that he has never smoked. He has never used smokeless tobacco. He reports current alcohol use. He reports that he does not use drugs.  Current Outpatient Medications   Medication Sig Dispense Refill    albuterol (ProAir HFA) 90 mcg/act inhaler Inhale 2 puffs every 6 (six) hours as needed for wheezing 18 g 2    cetirizine (ZyrTEC) 10 MG chewable tablet Chew 1 tablet (10 mg total) daily      Coenzyme Q10 (CO Q 10 PO) Take by mouth daily      Cyanocobalamin (VITAMIN B-12 PO) Take by mouth daily      montelukast  (SINGULAIR) 10 mg tablet Take 1 tablet (10 mg total) by mouth daily at bedtime      Multiple Vitamin (MULTIVITAMIN) capsule Take 1 capsule by mouth daily      Omega-3 Fatty Acids (FISH OIL) 1,000 mg Take 1,000 mg by mouth daily      pravastatin (PRAVACHOL) 20 mg tablet Take 1 tablet (20 mg total) by mouth daily at bedtime 30 tablet 5    sodium chloride (OCEAN) 0.65 % nasal spray 1 spray into each nostril 4 (four) times a day 60 mL 0    VITAMIN D PO Take by mouth daily       No current facility-administered medications for this visit.     Current Outpatient Medications on File Prior to Visit   Medication Sig    albuterol (ProAir HFA) 90 mcg/act inhaler Inhale 2 puffs every 6 (six) hours as needed for wheezing    Coenzyme Q10 (CO Q 10 PO) Take by mouth daily    Cyanocobalamin (VITAMIN B-12 PO) Take by mouth daily    Multiple Vitamin (MULTIVITAMIN) capsule Take 1 capsule by mouth daily    Omega-3 Fatty Acids (FISH OIL) 1,000 mg Take 1,000 mg by mouth daily    pravastatin (PRAVACHOL) 20 mg tablet Take 1 tablet (20 mg total) by mouth daily at bedtime    sodium chloride (OCEAN) 0.65 % nasal spray 1 spray into each nostril 4 (four) times a day    VITAMIN D PO Take by mouth daily    [DISCONTINUED] cetirizine (ZyrTEC) 10 MG chewable tablet Chew 1 tablet (10 mg total) daily (Patient taking differently: Chew 10 mg if needed)    [DISCONTINUED] montelukast (SINGULAIR) 10 mg tablet Take 1 tablet (10 mg total) by mouth daily at bedtime (Patient taking differently: Take 10 mg by mouth if needed)     No current facility-administered medications on file prior to visit.     Medications Discontinued During This Encounter   Medication Reason    cetirizine (ZyrTEC) 10 MG chewable tablet Reorder    montelukast (SINGULAIR) 10 mg tablet Reorder      He has No Known Allergies..    Review of Systems   Constitutional:  Negative for appetite change, chills, fatigue and fever.   HENT:  Negative for sore throat and trouble swallowing.    Eyes:   "Negative for redness.   Respiratory:  Negative for shortness of breath.    Cardiovascular:  Negative for chest pain and palpitations.   Gastrointestinal:  Negative for abdominal pain, constipation and diarrhea.   Genitourinary:  Negative for dysuria and hematuria.   Musculoskeletal:  Negative for back pain and neck pain.   Skin:  Negative for rash.   Neurological:  Negative for seizures, weakness and headaches.   Hematological:  Negative for adenopathy.   Psychiatric/Behavioral:  Negative for confusion. The patient is not nervous/anxious.          Objective:      /80   Pulse 69   Temp 97.6 °F (36.4 °C) (Temporal)   Ht 6' 2\" (1.88 m)   Wt 102 kg (224 lb)   SpO2 99%   BMI 28.76 kg/m²     Results Reviewed       None            No results found for this or any previous visit (from the past 1344 hour(s)).     Physical Exam  Constitutional:       General: He is not in acute distress.     Appearance: Normal appearance.   HENT:      Head: Normocephalic and atraumatic.      Nose: Nose normal.      Mouth/Throat:      Mouth: Mucous membranes are moist.   Eyes:      Extraocular Movements: Extraocular movements intact.      Pupils: Pupils are equal, round, and reactive to light.   Cardiovascular:      Rate and Rhythm: Normal rate and regular rhythm.      Pulses: Normal pulses.      Heart sounds: Normal heart sounds. No murmur heard.     No friction rub.   Pulmonary:      Effort: Pulmonary effort is normal. No respiratory distress.      Breath sounds: Normal breath sounds. No wheezing.   Abdominal:      General: Abdomen is flat. Bowel sounds are normal. There is no distension.      Palpations: Abdomen is soft. There is no mass.      Tenderness: There is no abdominal tenderness. There is no guarding.   Musculoskeletal:         General: Normal range of motion.      Cervical back: Normal range of motion.   Neurological:      General: No focal deficit present.      Mental Status: He is alert and oriented to person, place, " and time. Mental status is at baseline.      Cranial Nerves: No cranial nerve deficit.   Psychiatric:         Mood and Affect: Mood normal.         Behavior: Behavior normal.

## 2024-03-31 NOTE — TELEPHONE ENCOUNTER
Upon review of the In Basket request and the patient's chart, initial outreach has been made via fax, please see Contacts section for details       Thank you  Lidia Jones [employer]

## 2024-04-30 DIAGNOSIS — E78.2 MIXED HYPERLIPIDEMIA: ICD-10-CM

## 2024-04-30 NOTE — TELEPHONE ENCOUNTER
Reason for call:   [x] Refill   [] Prior Auth  [] Other:     Office:   [x] PCP/Provider -  Abdifatah Schmidt MD   [] Specialty/Provider -     Medication: pravastatin     Dose/Frequency: 20 mg / 1 tab at bedtime    Quantity: 30 tabs     Pharmacy: [facility] [id number] [facility] [address] [facility]      Does the patient have enough for 3 days?   [x] Yes   [] No - Send as HP to POD    
You can access the FollowMyHealth Patient Portal offered by [facility] by registering at the following website: [email or web address]. By joining [employer]’s FollowMyHealth portal, you will also be able to view your health information using other applications (apps) compatible with our system.

## 2024-05-02 RX ORDER — PRAVASTATIN SODIUM 20 MG
20 TABLET ORAL
Qty: 30 TABLET | Refills: 0 | Status: SHIPPED | OUTPATIENT
Start: 2024-05-02

## 2024-06-02 DIAGNOSIS — E78.2 MIXED HYPERLIPIDEMIA: ICD-10-CM

## 2024-06-03 RX ORDER — PRAVASTATIN SODIUM 20 MG
20 TABLET ORAL
Qty: 30 TABLET | Refills: 3 | Status: SHIPPED | OUTPATIENT
Start: 2024-06-03

## 2024-06-14 ENCOUNTER — OFFICE VISIT (OUTPATIENT)
Dept: INTERNAL MEDICINE CLINIC | Facility: CLINIC | Age: 59
End: 2024-06-14
Payer: COMMERCIAL

## 2024-06-14 VITALS
HEART RATE: 64 BPM | WEIGHT: 220 LBS | BODY MASS INDEX: 28.23 KG/M2 | HEIGHT: 74 IN | DIASTOLIC BLOOD PRESSURE: 92 MMHG | OXYGEN SATURATION: 100 % | TEMPERATURE: 98.1 F | SYSTOLIC BLOOD PRESSURE: 133 MMHG

## 2024-06-14 DIAGNOSIS — Z12.5 SCREENING FOR PROSTATE CANCER: ICD-10-CM

## 2024-06-14 DIAGNOSIS — Z00.00 ANNUAL PHYSICAL EXAM: Primary | ICD-10-CM

## 2024-06-14 DIAGNOSIS — J45.20 MILD INTERMITTENT ASTHMA WITHOUT COMPLICATION: ICD-10-CM

## 2024-06-14 DIAGNOSIS — E78.2 MIXED HYPERLIPIDEMIA: ICD-10-CM

## 2024-06-14 PROCEDURE — 99213 OFFICE O/P EST LOW 20 MIN: CPT | Performed by: INTERNAL MEDICINE

## 2024-06-14 PROCEDURE — 99396 PREV VISIT EST AGE 40-64: CPT | Performed by: INTERNAL MEDICINE

## 2024-06-14 NOTE — PROGRESS NOTES
Adult Annual Physical  Name: Odilon Hess      : 1965      MRN: [id number]  Encounter Provider: Abdifatah Schmidt MD  Encounter Date: 2024   Encounter department: [facility] INTERNAL MEDICINE    Assessment & Plan   1. Annual physical exam  -     CBC and differential; Future  -     Comprehensive metabolic panel; Future  -     Lipid panel; Future  -     Urinalysis with microscopic; Future  -     TSH, 3rd generation; Future  2. Mixed hyperlipidemia  Comments:  Stable continue pravastatin 20 mg daily.  3. Screening for prostate cancer  -     PSA, Total Screen; Future  4. Mild intermittent asthma without complication  Comments:  This has improved continue to monitor.  Immunizations and preventive care screenings were discussed with patient today. Appropriate education was printed on patient's after visit summary.    Discussed risks and benefits of prostate cancer screening. We discussed the controversial history of PSA screening for prostate cancer in the [place] as well as the risk of over detection and over treatment of prostate cancer by way of PSA screening.  The patient understands that PSA blood testing is an imperfect way to screen for prostate cancer and that elevated PSA levels in the blood may also be caused by infection, inflammation, prostatic trauma or manipulation, urological procedures, or by benign prostatic enlargement.    The role of the digital rectal examination in prostate cancer screening was also discussed and I discussed with him that there is large interobserver variability in the findings of digital rectal examination.    Counseling:  Exercise: the importance of regular exercise/physical activity was discussed. Recommend exercise 3-5 times per week for at least 30 minutes.          History of Present Illness     Adult Annual Physical:  Patient presents for annual physical.     Diet and Physical Activity:  - Diet/Nutrition: well balanced diet and consuming 3-5  servings of fruits/vegetables daily.  - Exercise: walking.    General Health:  - Sleep: sleeps well, 4-6 hours of sleep on average and snores loudly.  - Hearing: normal hearing bilateral ears.  - Vision: most recent eye exam > 1 year ago and wears glasses.  - Dental: regular dental visits and brushes teeth twice daily.    /GYN Health:    - History of STDs: no     Health:  - History of STDs: no.     Advanced Care Planning:  - Has an advanced directive?: no    - Has a durable medical POA?: no    - ACP document given to patient?: no      Review of Systems   Constitutional:  Negative for appetite change, chills, fatigue and fever.   HENT:  Negative for sore throat and trouble swallowing.    Eyes:  Negative for redness.   Respiratory:  Negative for shortness of breath.    Cardiovascular:  Negative for chest pain and palpitations.   Gastrointestinal:  Negative for abdominal pain, constipation and diarrhea.   Genitourinary:  Negative for dysuria and hematuria.   Musculoskeletal:  Negative for back pain and neck pain.   Skin:  Negative for rash.   Neurological:  Negative for seizures, weakness and headaches.   Hematological:  Negative for adenopathy.   Psychiatric/Behavioral:  Negative for confusion. The patient is not nervous/anxious.      Pertinent Medical History       Medical History Reviewed by provider this encounter:  Tobacco  Allergies  Meds  Problems  Med Hx  Surg Hx  Fam Hx       Current Outpatient Medications on File Prior to Visit   Medication Sig Dispense Refill    albuterol (ProAir HFA) 90 mcg/act inhaler Inhale 2 puffs every 6 (six) hours as needed for wheezing 18 g 2    cetirizine (ZyrTEC) 10 MG chewable tablet Chew 1 tablet (10 mg total) daily      Coenzyme Q10 (CO Q 10 PO) Take by mouth daily      Cyanocobalamin (VITAMIN B-12 PO) Take by mouth daily      Multiple Vitamin (MULTIVITAMIN) capsule Take 1 capsule by mouth daily      Omega-3 Fatty Acids (FISH OIL) 1,000 mg Take 1,000 mg by mouth daily  "     pravastatin (PRAVACHOL) 20 mg tablet TAKE 1 TABLET BY MOUTH ONCE DAILY AT BEDTIME 30 tablet 3    sodium chloride (OCEAN) 0.65 % nasal spray 1 spray into each nostril 4 (four) times a day 60 mL 0    VITAMIN D PO Take by mouth daily      montelukast (SINGULAIR) 10 mg tablet Take 1 tablet (10 mg total) by mouth daily at bedtime       No current facility-administered medications on file prior to visit.      Social History     Tobacco Use    Smoking status: Never    Smokeless tobacco: Never   Vaping Use    Vaping status: Never Used   Substance and Sexual Activity    Alcohol use: Yes     Comment: social    Drug use: No    Sexual activity: Not Currently       Objective     /92 (BP Location: Left arm, Patient Position: Sitting, Cuff Size: Large)   Pulse 64   Temp 98.1 °F (36.7 °C) (Temporal)   Ht 6' 2\" (1.88 m)   Wt 99.8 kg (220 lb)   SpO2 100%   BMI 28.25 kg/m²     Physical Exam  Vitals and nursing note reviewed.   Constitutional:       General: He is not in acute distress.     Appearance: He is well-developed.   HENT:      Head: Normocephalic and atraumatic.      Mouth/Throat:      Mouth: Mucous membranes are moist.   Eyes:      Conjunctiva/sclera: Conjunctivae normal.   Cardiovascular:      Rate and Rhythm: Normal rate and regular rhythm.      Heart sounds: No murmur heard.  Pulmonary:      Effort: Pulmonary effort is normal. No respiratory distress.      Breath sounds: Normal breath sounds.   Abdominal:      Palpations: Abdomen is soft.      Tenderness: There is no abdominal tenderness.   Musculoskeletal:         General: No swelling.      Cervical back: Normal range of motion and neck supple.   Skin:     General: Skin is warm and dry.      Capillary Refill: Capillary refill takes less than 2 seconds.   Neurological:      General: No focal deficit present.      Mental Status: He is alert.   Psychiatric:         Mood and Affect: Mood normal.       Administrative Statements         "

## 2024-09-21 DIAGNOSIS — E78.2 MIXED HYPERLIPIDEMIA: ICD-10-CM

## 2024-09-23 RX ORDER — PRAVASTATIN SODIUM 20 MG
20 TABLET ORAL
Qty: 90 TABLET | Refills: 1 | Status: SHIPPED | OUTPATIENT
Start: 2024-09-23

## 2024-11-23 LAB
ALBUMIN SERPL-MCNC: 4.4 G/DL (ref 3.5–5.7)
ALP SERPL-CCNC: 54 U/L (ref 35–120)
ALT SERPL-CCNC: 20 U/L
AMORPH URATE CRY URNS QL MICRO: PRESENT
ANION GAP SERPL CALCULATED.3IONS-SCNC: 7 MMOL/L (ref 3–11)
AST SERPL-CCNC: 19 U/L
BASOPHILS # BLD AUTO: 0 THOU/CMM (ref 0–0.1)
BASOPHILS NFR BLD AUTO: 1 %
BILIRUB SERPL-MCNC: 0.6 MG/DL (ref 0.2–1)
BUN SERPL-MCNC: 16 MG/DL (ref 7–28)
CALCIUM SERPL-MCNC: 9.1 MG/DL (ref 8.5–10.5)
CHLORIDE SERPL-SCNC: 105 MMOL/L (ref 100–109)
CHOLEST SERPL-MCNC: 182 MG/DL
CHOLEST/HDLC SERPL: 4.9 {RATIO}
CO2 SERPL-SCNC: 31 MMOL/L (ref 21–31)
CREAT SERPL-MCNC: 1.1 MG/DL (ref 0.53–1.3)
CYTOLOGY CMNT CVX/VAG CYTO-IMP: ABNORMAL
DIFFERENTIAL METHOD BLD: NORMAL
EOSINOPHIL # BLD AUTO: 0.2 THOU/CMM (ref 0–0.5)
EOSINOPHIL NFR BLD AUTO: 3 %
ERYTHROCYTE [DISTWIDTH] IN BLOOD BY AUTOMATED COUNT: 13.1 % (ref 12–16)
GFR/BSA.PRED SERPLBLD CYS-BASED-ARV: 78 ML/MIN/{1.73_M2}
GLUCOSE SERPL-MCNC: 94 MG/DL (ref 65–99)
GLUCOSE UR QL STRIP: NEGATIVE MG/DL
HCT VFR BLD AUTO: 42 % (ref 37–48)
HDLC SERPL-MCNC: 37 MG/DL (ref 23–92)
HGB BLD-MCNC: 13.9 G/DL (ref 12.5–17)
HGB UR QL STRIP: NEGATIVE MG/DL
KETONES UR QL STRIP: NEGATIVE MG/DL
LDLC SERPL CALC-MCNC: 103 MG/DL
LEUKOCYTE ESTERASE UR QL STRIP: NEGATIVE /UL
LYMPHOCYTES # BLD AUTO: 1.8 THOU/CMM (ref 1–3)
LYMPHOCYTES NFR BLD AUTO: 33 %
MCH RBC QN AUTO: 29.2 PG (ref 27–36)
MCHC RBC AUTO-ENTMCNC: 33 G/DL (ref 32–37)
MCV RBC AUTO: 88 FL (ref 80–100)
MONOCYTES # BLD AUTO: 0.4 THOU/CMM (ref 0.3–1)
MONOCYTES NFR BLD AUTO: 8 %
MUCOUS THREADS URNS QL MICRO: NORMAL
NEUTROPHILS # BLD AUTO: 3.1 THOU/CMM (ref 1.8–7.8)
NEUTROPHILS NFR BLD AUTO: 55 %
NITRITE UR QL STRIP: NEGATIVE
NONHDLC SERPL-MCNC: 145 MG/DL
PH UR: 7 [PH] (ref 4.5–8)
PLATELET # BLD AUTO: 212 THOU/CMM (ref 140–350)
PMV BLD REES-ECKER: 8.5 FL (ref 7.5–11.3)
POTASSIUM SERPL-SCNC: 4.6 MMOL/L (ref 3.5–5.2)
PROT 24H UR-MRATE: NEGATIVE MG/DL
PROT SERPL-MCNC: 6.1 G/DL (ref 6.3–8.3)
PSA SERPL-MCNC: 0.33 NG/ML
RBC # BLD AUTO: 4.75 MILL/CMM (ref 4–5.4)
RBC #/AREA URNS HPF: NORMAL /HPF (ref 0–2)
SL AMB POCT URINE COMMENT: NORMAL
SODIUM SERPL-SCNC: 143 MMOL/L (ref 135–145)
SP GR UR: 1.02 (ref 1–1.03)
TRIGL SERPL-MCNC: 211 MG/DL
TSH SERPL-ACNC: 1.25 UIU/ML (ref 0.45–5.33)
WBC # BLD AUTO: 5.5 THOU/CMM (ref 4–10.5)
WBC #/AREA URNS HPF: 0 /HPF (ref 0–5)

## 2024-12-09 ENCOUNTER — OFFICE VISIT (OUTPATIENT)
Dept: INTERNAL MEDICINE CLINIC | Facility: CLINIC | Age: 59
End: 2024-12-09
Payer: COMMERCIAL

## 2024-12-09 VITALS
SYSTOLIC BLOOD PRESSURE: 130 MMHG | OXYGEN SATURATION: 98 % | BODY MASS INDEX: 27.73 KG/M2 | TEMPERATURE: 97.9 F | WEIGHT: 216 LBS | DIASTOLIC BLOOD PRESSURE: 70 MMHG | HEART RATE: 82 BPM

## 2024-12-09 DIAGNOSIS — E78.2 MIXED HYPERLIPIDEMIA: Primary | ICD-10-CM

## 2024-12-09 DIAGNOSIS — J45.20 MILD INTERMITTENT ASTHMA WITHOUT COMPLICATION: ICD-10-CM

## 2024-12-09 PROCEDURE — 99214 OFFICE O/P EST MOD 30 MIN: CPT | Performed by: INTERNAL MEDICINE

## 2024-12-09 NOTE — PROGRESS NOTES
Assessment/Plan:           1. Mixed hyperlipidemia  Comments:  Continue pravastatin.  2. Mild intermittent asthma without complication  Comments:  Advised to continue albuterol and cetirizine.  May restart montelukast.         1. Mixed hyperlipidemia (Primary)      2. Mild intermittent asthma without complication         No problem-specific Assessment & Plan notes found for this encounter.           Subjective:      Patient ID: Odilon Hess is a 59 y.o. male.    HPI    The following portions of the patient's history were reviewed and updated as appropriate: He  has a past medical history of COVID-19 and No pertinent past medical history.  He There are no active problems to display for this patient.    He  has a past surgical history that includes Hernia repair; Colonoscopy (02/10/2017); Vasectomy; and Eye surgery (2002).  His family history includes COPD in his father; Hyperlipidemia in his father; No Known Problems in his brother, mother, and sister.  He  reports that he has never smoked. He has never used smokeless tobacco. He reports current alcohol use of about 7.0 standard drinks of alcohol per week. He reports that he does not use drugs.  Current Outpatient Medications   Medication Sig Dispense Refill    Coenzyme Q10 (CO Q 10 PO) Take by mouth daily      Cyanocobalamin (VITAMIN B-12 PO) Take by mouth daily      Multiple Vitamin (MULTIVITAMIN) capsule Take 1 capsule by mouth daily      Omega-3 Fatty Acids (FISH OIL) 1,000 mg Take 1,000 mg by mouth daily      pravastatin (PRAVACHOL) 20 mg tablet TAKE 1 TABLET BY MOUTH ONCE DAILY AT BEDTIME 90 tablet 1    sodium chloride (OCEAN) 0.65 % nasal spray 1 spray into each nostril 4 (four) times a day (Patient taking differently: 1 spray into each nostril as needed) 60 mL 0    albuterol (ProAir HFA) 90 mcg/act inhaler Inhale 2 puffs every 6 (six) hours as needed for wheezing 18 g 2    cetirizine (ZyrTEC) 10 MG chewable tablet Chew 1 tablet (10 mg total) daily       montelukast (SINGULAIR) 10 mg tablet Take 1 tablet (10 mg total) by mouth daily at bedtime      VITAMIN D PO Take by mouth daily       No current facility-administered medications for this visit.     Current Outpatient Medications on File Prior to Visit   Medication Sig    Coenzyme Q10 (CO Q 10 PO) Take by mouth daily    Cyanocobalamin (VITAMIN B-12 PO) Take by mouth daily    Multiple Vitamin (MULTIVITAMIN) capsule Take 1 capsule by mouth daily    Omega-3 Fatty Acids (FISH OIL) 1,000 mg Take 1,000 mg by mouth daily    pravastatin (PRAVACHOL) 20 mg tablet TAKE 1 TABLET BY MOUTH ONCE DAILY AT BEDTIME    sodium chloride (OCEAN) 0.65 % nasal spray 1 spray into each nostril 4 (four) times a day (Patient taking differently: 1 spray into each nostril as needed)    albuterol (ProAir HFA) 90 mcg/act inhaler Inhale 2 puffs every 6 (six) hours as needed for wheezing    cetirizine (ZyrTEC) 10 MG chewable tablet Chew 1 tablet (10 mg total) daily    montelukast (SINGULAIR) 10 mg tablet Take 1 tablet (10 mg total) by mouth daily at bedtime    VITAMIN D PO Take by mouth daily     No current facility-administered medications on file prior to visit.     There are no discontinued medications.   He has no known allergies..    Review of Systems   Constitutional:  Negative for appetite change, chills, fatigue and fever.   HENT:  Negative for sore throat and trouble swallowing.    Eyes:  Negative for redness.   Respiratory:  Negative for shortness of breath.    Cardiovascular:  Negative for chest pain and palpitations.   Gastrointestinal:  Negative for abdominal pain, constipation and diarrhea.   Genitourinary:  Negative for dysuria and hematuria.   Musculoskeletal:  Negative for back pain and neck pain.   Skin:  Negative for rash.   Neurological:  Negative for seizures, weakness and headaches.   Hematological:  Negative for adenopathy.   Psychiatric/Behavioral:  Negative for confusion. The patient is not nervous/anxious.           Objective:      /70 (BP Location: Left arm, Patient Position: Sitting, Cuff Size: Standard)   Pulse 82   Temp 97.9 °F (36.6 °C) (Temporal)   Wt 98 kg (216 lb)   SpO2 98%   BMI 27.73 kg/m²     Results Reviewed       None            Recent Results (from the past 8 weeks)   CBC and differential    Collection Time: 11/23/24  7:21 AM   Result Value Ref Range    Hemoglobin 13.9 12.5 - 17.0 g/dL    HCT 42.0 37.0 - 48.0 %    White Blood Cell Count 5.5 4.0 - 10.5 thou/cmm    Red Blood Cell Count 4.75 4.00 - 5.40 mill/cmm    Platelet Count 212 140 - 350 thou/cmm    SL AMB MPV 8.5 7.5 - 11.3 fL    MCV 88 80 - 100 fL    MCH 29.2 27.0 - 36.0 pg    MCHC 33.0 32.0 - 37.0 g/dL    RDW 13.1 12.0 - 16.0 %    Differential Type AUTO     Neutrophils (Absolute) 3.1 1.8 - 7.8 thou/cmm    Lymphocytes (Absolute) 1.8 1.0 - 3.0 thou/cmm    Monocytes (Absolute) 0.4 0.3 - 1.0 thou/cmm    Eosinophils (Absolute) 0.2 0.0 - 0.5 thou/cmm    Basophils ABS 0.0 0.0 - 0.1 thou/cmm    Neutrophils 55 %    Lymphocytes 33 %    Monocytes 8 %    Eosinophils 3 %    Basophils PCT 1 %   Comprehensive metabolic panel    Collection Time: 11/23/24  7:21 AM   Result Value Ref Range    Glucose, Random 94 65 - 99 mg/dL    BUN 16 7 - 28 mg/dL    Creatinine 1.10 0.53 - 1.30 mg/dL    Sodium 143 135 - 145 mmol/L    Potassium 4.6 3.5 - 5.2 mmol/L    Chloride 105 100 - 109 mmol/L    CO2 31 21 - 31 mmol/L    Calcium 9.1 8.5 - 10.5 mg/dL    Alkaline Phosphatase 54 35 - 120 U/L    Albumin 4.4 3.5 - 5.7 g/dL    TOTAL BILIRUBIN 0.6 0.2 - 1.0 mg/dL    Protein, Total 6.1 (L) 6.3 - 8.3 g/dL    AST 19 <41 U/L    ALT 20 <56 U/L    ANION GAP 7 3 - 11    eGFR 78 >59    Comment (Note)    Lipid panel    Collection Time: 11/23/24  7:21 AM   Result Value Ref Range    Cholesterol, Total 182 <200 mg/dL    Triglycerides 211 (H) <150 mg/dL    HDL Cholesterol 37 23 - 92 mg/dL    Non HDL Chol. (LDL+VLDL) 145 <160 mg/dL    LDL Calculated 103 <130 mg/dL    Chol HDLC Ratio 4.9    PSA,  Total Screen    Collection Time: 11/23/24  7:21 AM   Result Value Ref Range    Prostate Specific Antigen Total 0.33 <4.00 ng/mL   TSH, 3rd generation    Collection Time: 11/23/24  7:21 AM   Result Value Ref Range    TSH 1.25 0.45 - 5.33 uIU/mL   Urinalysis with microscopic    Collection Time: 11/23/24  7:21 AM   Result Value Ref Range    Specific Gravity Ur 1.016 1.003 - 1.030    pH, Urine 7.0 4.5 - 8.0    Protein, Urine Negative NEG mg/dL    Glucose, Urine Negative NEG mg/dL    Ketone, Urine Negative NEG mg/dL    Blood, Urine Negative NEG mg/dL    Leukocyte Esterase Negative NEG /uL    Nitrites Urine Negative NEG    POCT URINE COMMENT Not applicable     RBC, Urine 0-2 0 - 2 /hpf    SL AMB WBC, URINE 0 0 - 5 /hpf    Amorphous Crystals, UA Present     MUCUS THREADS Few         Physical Exam  Constitutional:       General: He is not in acute distress.     Appearance: Normal appearance.   HENT:      Head: Normocephalic and atraumatic.      Nose: Nose normal.      Mouth/Throat:      Mouth: Mucous membranes are moist.   Eyes:      Extraocular Movements: Extraocular movements intact.      Pupils: Pupils are equal, round, and reactive to light.   Cardiovascular:      Rate and Rhythm: Normal rate and regular rhythm.      Pulses: Normal pulses.      Heart sounds: Normal heart sounds. No murmur heard.     No friction rub.   Pulmonary:      Effort: Pulmonary effort is normal. No respiratory distress.      Breath sounds: Normal breath sounds. No wheezing.   Abdominal:      General: Abdomen is flat. Bowel sounds are normal. There is no distension.      Palpations: Abdomen is soft. There is no mass.      Tenderness: There is no abdominal tenderness. There is no guarding.   Musculoskeletal:         General: Normal range of motion.      Cervical back: Neck supple.   Skin:     General: Skin is warm.   Neurological:      General: No focal deficit present.      Mental Status: He is alert and oriented to person, place, and time. Mental  status is at baseline.      Cranial Nerves: No cranial nerve deficit.   Psychiatric:         Mood and Affect: Mood normal.         Behavior: Behavior normal.

## 2025-04-14 DIAGNOSIS — E78.2 MIXED HYPERLIPIDEMIA: ICD-10-CM

## 2025-04-15 RX ORDER — PRAVASTATIN SODIUM 20 MG
20 TABLET ORAL
Qty: 90 TABLET | Refills: 1 | Status: SHIPPED | OUTPATIENT
Start: 2025-04-15

## 2025-05-20 ENCOUNTER — APPOINTMENT (EMERGENCY)
Dept: RADIOLOGY | Facility: HOSPITAL | Age: 60
DRG: 322 | End: 2025-05-20
Payer: COMMERCIAL

## 2025-05-20 ENCOUNTER — APPOINTMENT (EMERGENCY)
Dept: NON INVASIVE DIAGNOSTICS | Facility: HOSPITAL | Age: 60
DRG: 322 | End: 2025-05-20
Payer: COMMERCIAL

## 2025-05-20 ENCOUNTER — PREP FOR PROCEDURE (OUTPATIENT)
Dept: CARDIOLOGY CLINIC | Facility: CLINIC | Age: 60
End: 2025-05-20

## 2025-05-20 ENCOUNTER — HOSPITAL ENCOUNTER (INPATIENT)
Facility: HOSPITAL | Age: 60
LOS: 1 days | Discharge: HOME | DRG: 322 | End: 2025-05-21
Attending: EMERGENCY MEDICINE | Admitting: STUDENT IN AN ORGANIZED HEALTH CARE EDUCATION/TRAINING PROGRAM
Payer: COMMERCIAL

## 2025-05-20 ENCOUNTER — NURSE TRIAGE (OUTPATIENT)
Age: 60
End: 2025-05-20

## 2025-05-20 ENCOUNTER — APPOINTMENT (EMERGENCY)
Dept: CT IMAGING | Facility: HOSPITAL | Age: 60
DRG: 322 | End: 2025-05-20
Payer: COMMERCIAL

## 2025-05-20 DIAGNOSIS — I25.10 CORONARY ARTERY DISEASE INVOLVING NATIVE CORONARY ARTERY: Primary | ICD-10-CM

## 2025-05-20 DIAGNOSIS — R93.89 ABNORMAL CT SCAN: ICD-10-CM

## 2025-05-20 DIAGNOSIS — I21.4 NSTEMI (NON-ST ELEVATED MYOCARDIAL INFARCTION) (HCC): ICD-10-CM

## 2025-05-20 DIAGNOSIS — R07.9 CHEST PAIN: ICD-10-CM

## 2025-05-20 DIAGNOSIS — I77.4 STENOSIS OF CELIAC ARTERY (HCC): ICD-10-CM

## 2025-05-20 DIAGNOSIS — I21.4 NSTEMI (NON-ST ELEVATED MYOCARDIAL INFARCTION) (HCC): Primary | ICD-10-CM

## 2025-05-20 DIAGNOSIS — R79.89 ELEVATED TROPONIN: ICD-10-CM

## 2025-05-20 PROBLEM — E78.5 HYPERLIPIDEMIA: Status: ACTIVE | Noted: 2025-05-20

## 2025-05-20 LAB
2HR DELTA HS TROPONIN: 947 NG/L
ALBUMIN SERPL BCG-MCNC: 4.4 G/DL (ref 3.5–5)
ALP SERPL-CCNC: 56 U/L (ref 34–104)
ALT SERPL W P-5'-P-CCNC: 23 U/L (ref 7–52)
ANION GAP SERPL CALCULATED.3IONS-SCNC: 7 MMOL/L (ref 4–13)
APTT PPP: 27 SECONDS (ref 23–34)
AST SERPL W P-5'-P-CCNC: 39 U/L (ref 13–39)
ATRIAL RATE: 70 BPM
ATRIAL RATE: 76 BPM
BASOPHILS NFR BLD AUTO: 0 % (ref 0–1)
BILIRUB SERPL-MCNC: 0.58 MG/DL (ref 0.2–1)
BSA FOR ECHO PROCEDURE: 2.24 M2
BUN SERPL-MCNC: 15 MG/DL (ref 5–25)
CALCIUM SERPL-MCNC: 9.6 MG/DL (ref 8.4–10.2)
CARDIAC TROPONIN I PNL SERPL HS: 2321 NG/L (ref ?–50)
CARDIAC TROPONIN I PNL SERPL HS: 3268 NG/L (ref ?–50)
CHLORIDE SERPL-SCNC: 104 MMOL/L (ref 96–108)
CO2 SERPL-SCNC: 29 MMOL/L (ref 21–32)
CREAT SERPL-MCNC: 1.04 MG/DL (ref 0.6–1.3)
EOSINOPHIL NFR BLD AUTO: 1 % (ref 0–6)
ERYTHROCYTE [DISTWIDTH] IN BLOOD BY AUTOMATED COUNT: 12.4 % (ref 11.6–15.1)
GFR SERPL CREATININE-BSD FRML MDRD: 78 ML/MIN/1.73SQ M
GLOBAL LONGITUIDAL STRAIN: -12 %
GLUCOSE SERPL-MCNC: 111 MG/DL (ref 65–140)
HCT VFR BLD AUTO: 45.6 % (ref 36.5–49.3)
HGB BLD-MCNC: 15.1 G/DL (ref 12–17)
INR PPP: 1.02 (ref 0.85–1.19)
KCT BLD-ACNC: 306 SEC (ref 89–137)
LIPASE SERPL-CCNC: 26 U/L (ref 11–82)
LYMPHOCYTES NFR BLD AUTO: 25 % (ref 14–44)
MCH RBC QN AUTO: 28.6 PG (ref 26.8–34.3)
MCHC RBC AUTO-ENTMCNC: 33.1 G/DL (ref 31.4–37.4)
MCV RBC AUTO: 86 FL (ref 82–98)
MONOCYTES NFR BLD AUTO: 8 % (ref 4–12)
NEUTS SEG NFR BLD AUTO: 66 % (ref 43–75)
P AXIS: 69 DEGREES
P AXIS: 74 DEGREES
PLATELET # BLD AUTO: 230 THOUSANDS/UL (ref 149–390)
PMV BLD AUTO: 9.9 FL (ref 8.9–12.7)
POTASSIUM SERPL-SCNC: 3.9 MMOL/L (ref 3.5–5.3)
PR INTERVAL: 146 MS
PR INTERVAL: 150 MS
PROT SERPL-MCNC: 6.7 G/DL (ref 6.4–8.4)
PROTHROMBIN TIME: 13.6 SECONDS (ref 12.3–15)
QRS AXIS: 57 DEGREES
QRS AXIS: 63 DEGREES
QRSD INTERVAL: 82 MS
QRSD INTERVAL: 84 MS
QT INTERVAL: 396 MS
QT INTERVAL: 400 MS
QTC INTERVAL: 432 MS
QTC INTERVAL: 445 MS
RA PRESSURE ESTIMATED: 3 MMHG
RBC # BLD AUTO: 5.28 MILLION/UL (ref 3.88–5.62)
SL CV LV EF: 50
SODIUM SERPL-SCNC: 140 MMOL/L (ref 135–147)
SPECIMEN SOURCE: ABNORMAL
T WAVE AXIS: 45 DEGREES
T WAVE AXIS: 63 DEGREES
VENTRICULAR RATE: 70 BPM
VENTRICULAR RATE: 76 BPM
WBC # BLD AUTO: 3.81 THOUSAND/UL (ref 4.31–10.16)

## 2025-05-20 PROCEDURE — 99153 MOD SED SAME PHYS/QHP EA: CPT | Performed by: INTERNAL MEDICINE

## 2025-05-20 PROCEDURE — 99152 MOD SED SAME PHYS/QHP 5/>YRS: CPT | Performed by: INTERNAL MEDICINE

## 2025-05-20 PROCEDURE — 99285 EMERGENCY DEPT VISIT HI MDM: CPT

## 2025-05-20 PROCEDURE — C1769 GUIDE WIRE: HCPCS | Performed by: INTERNAL MEDICINE

## 2025-05-20 PROCEDURE — C1894 INTRO/SHEATH, NON-LASER: HCPCS | Performed by: INTERNAL MEDICINE

## 2025-05-20 PROCEDURE — 85730 THROMBOPLASTIN TIME PARTIAL: CPT | Performed by: EMERGENCY MEDICINE

## 2025-05-20 PROCEDURE — 71046 X-RAY EXAM CHEST 2 VIEWS: CPT

## 2025-05-20 PROCEDURE — 027034Z DILATION OF CORONARY ARTERY, ONE ARTERY WITH DRUG-ELUTING INTRALUMINAL DEVICE, PERCUTANEOUS APPROACH: ICD-10-PCS | Performed by: INTERNAL MEDICINE

## 2025-05-20 PROCEDURE — C9600 PERC DRUG-EL COR STENT SING: HCPCS | Performed by: INTERNAL MEDICINE

## 2025-05-20 PROCEDURE — 99205 OFFICE O/P NEW HI 60 MIN: CPT | Performed by: INTERNAL MEDICINE

## 2025-05-20 PROCEDURE — 96375 TX/PRO/DX INJ NEW DRUG ADDON: CPT

## 2025-05-20 PROCEDURE — 36415 COLL VENOUS BLD VENIPUNCTURE: CPT | Performed by: EMERGENCY MEDICINE

## 2025-05-20 PROCEDURE — 99223 1ST HOSP IP/OBS HIGH 75: CPT | Performed by: STUDENT IN AN ORGANIZED HEALTH CARE EDUCATION/TRAINING PROGRAM

## 2025-05-20 PROCEDURE — 99215 OFFICE O/P EST HI 40 MIN: CPT | Performed by: INTERNAL MEDICINE

## 2025-05-20 PROCEDURE — 85347 COAGULATION TIME ACTIVATED: CPT

## 2025-05-20 PROCEDURE — 92928 PRQ TCAT PLMT NTRAC ST 1 LES: CPT | Performed by: INTERNAL MEDICINE

## 2025-05-20 PROCEDURE — 84484 ASSAY OF TROPONIN QUANT: CPT | Performed by: EMERGENCY MEDICINE

## 2025-05-20 PROCEDURE — 93005 ELECTROCARDIOGRAM TRACING: CPT

## 2025-05-20 PROCEDURE — 80053 COMPREHEN METABOLIC PANEL: CPT | Performed by: EMERGENCY MEDICINE

## 2025-05-20 PROCEDURE — 93010 ELECTROCARDIOGRAM REPORT: CPT | Performed by: INTERNAL MEDICINE

## 2025-05-20 PROCEDURE — 93458 L HRT ARTERY/VENTRICLE ANGIO: CPT | Performed by: INTERNAL MEDICINE

## 2025-05-20 PROCEDURE — 85025 COMPLETE CBC W/AUTO DIFF WBC: CPT | Performed by: EMERGENCY MEDICINE

## 2025-05-20 PROCEDURE — 74174 CTA ABD&PLVS W/CONTRAST: CPT

## 2025-05-20 PROCEDURE — 93306 TTE W/DOPPLER COMPLETE: CPT | Performed by: INTERNAL MEDICINE

## 2025-05-20 PROCEDURE — C1874 STENT, COATED/COV W/DEL SYS: HCPCS | Performed by: INTERNAL MEDICINE

## 2025-05-20 PROCEDURE — 99291 CRITICAL CARE FIRST HOUR: CPT | Performed by: EMERGENCY MEDICINE

## 2025-05-20 PROCEDURE — 83690 ASSAY OF LIPASE: CPT | Performed by: EMERGENCY MEDICINE

## 2025-05-20 PROCEDURE — 85610 PROTHROMBIN TIME: CPT | Performed by: EMERGENCY MEDICINE

## 2025-05-20 PROCEDURE — 96365 THER/PROPH/DIAG IV INF INIT: CPT

## 2025-05-20 PROCEDURE — C1725 CATH, TRANSLUMIN NON-LASER: HCPCS | Performed by: INTERNAL MEDICINE

## 2025-05-20 PROCEDURE — 4A023N7 MEASUREMENT OF CARDIAC SAMPLING AND PRESSURE, LEFT HEART, PERCUTANEOUS APPROACH: ICD-10-PCS | Performed by: INTERNAL MEDICINE

## 2025-05-20 PROCEDURE — B2111ZZ FLUOROSCOPY OF MULTIPLE CORONARY ARTERIES USING LOW OSMOLAR CONTRAST: ICD-10-PCS | Performed by: INTERNAL MEDICINE

## 2025-05-20 PROCEDURE — 71275 CT ANGIOGRAPHY CHEST: CPT

## 2025-05-20 PROCEDURE — 93306 TTE W/DOPPLER COMPLETE: CPT

## 2025-05-20 PROCEDURE — C1887 CATHETER, GUIDING: HCPCS | Performed by: INTERNAL MEDICINE

## 2025-05-20 DEVICE — STENT ONYXNG30030UX ONYX 3.00X30RX
Type: IMPLANTABLE DEVICE | Site: HEART | Status: FUNCTIONAL
Brand: ONYX FRONTIER™

## 2025-05-20 RX ORDER — METOPROLOL SUCCINATE 25 MG/1
25 TABLET, EXTENDED RELEASE ORAL DAILY
Status: DISCONTINUED | OUTPATIENT
Start: 2025-05-21 | End: 2025-05-21 | Stop reason: HOSPADM

## 2025-05-20 RX ORDER — HEPARIN SODIUM 1000 [USP'U]/ML
4000 INJECTION, SOLUTION INTRAVENOUS; SUBCUTANEOUS ONCE
Status: COMPLETED | OUTPATIENT
Start: 2025-05-20 | End: 2025-05-20

## 2025-05-20 RX ORDER — NITROGLYCERIN 20 MG/100ML
INJECTION INTRAVENOUS CODE/TRAUMA/SEDATION MEDICATION
Status: DISCONTINUED | OUTPATIENT
Start: 2025-05-20 | End: 2025-05-20 | Stop reason: HOSPADM

## 2025-05-20 RX ORDER — MIDAZOLAM HYDROCHLORIDE 2 MG/2ML
INJECTION, SOLUTION INTRAMUSCULAR; INTRAVENOUS CODE/TRAUMA/SEDATION MEDICATION
Status: DISCONTINUED | OUTPATIENT
Start: 2025-05-20 | End: 2025-05-20 | Stop reason: HOSPADM

## 2025-05-20 RX ORDER — HEPARIN SODIUM 1000 [USP'U]/ML
4000 INJECTION, SOLUTION INTRAVENOUS; SUBCUTANEOUS EVERY 6 HOURS PRN
Status: DISCONTINUED | OUTPATIENT
Start: 2025-05-20 | End: 2025-05-20

## 2025-05-20 RX ORDER — VERAPAMIL HYDROCHLORIDE 2.5 MG/ML
INJECTION INTRAVENOUS CODE/TRAUMA/SEDATION MEDICATION
Status: DISCONTINUED | OUTPATIENT
Start: 2025-05-20 | End: 2025-05-20 | Stop reason: HOSPADM

## 2025-05-20 RX ORDER — HEPARIN SODIUM 1000 [USP'U]/ML
INJECTION, SOLUTION INTRAVENOUS; SUBCUTANEOUS CODE/TRAUMA/SEDATION MEDICATION
Status: DISCONTINUED | OUTPATIENT
Start: 2025-05-20 | End: 2025-05-20 | Stop reason: HOSPADM

## 2025-05-20 RX ORDER — METOPROLOL TARTRATE 25 MG/1
25 TABLET, FILM COATED ORAL ONCE
Status: COMPLETED | OUTPATIENT
Start: 2025-05-20 | End: 2025-05-20

## 2025-05-20 RX ORDER — ASPIRIN 81 MG/1
81 TABLET, CHEWABLE ORAL DAILY
Status: DISCONTINUED | OUTPATIENT
Start: 2025-05-21 | End: 2025-05-21 | Stop reason: HOSPADM

## 2025-05-20 RX ORDER — ATORVASTATIN CALCIUM 80 MG/1
80 TABLET, FILM COATED ORAL ONCE
Status: COMPLETED | OUTPATIENT
Start: 2025-05-20 | End: 2025-05-20

## 2025-05-20 RX ORDER — FENTANYL CITRATE 50 UG/ML
INJECTION, SOLUTION INTRAMUSCULAR; INTRAVENOUS CODE/TRAUMA/SEDATION MEDICATION
Status: DISCONTINUED | OUTPATIENT
Start: 2025-05-20 | End: 2025-05-20 | Stop reason: HOSPADM

## 2025-05-20 RX ORDER — PRAVASTATIN SODIUM 20 MG
20 TABLET ORAL
Status: DISCONTINUED | OUTPATIENT
Start: 2025-05-20 | End: 2025-05-20

## 2025-05-20 RX ORDER — HEPARIN SODIUM 1000 [USP'U]/ML
2000 INJECTION, SOLUTION INTRAVENOUS; SUBCUTANEOUS EVERY 6 HOURS PRN
Status: DISCONTINUED | OUTPATIENT
Start: 2025-05-20 | End: 2025-05-20

## 2025-05-20 RX ORDER — SODIUM CHLORIDE 9 MG/ML
75 INJECTION, SOLUTION INTRAVENOUS CONTINUOUS
Status: DISPENSED | OUTPATIENT
Start: 2025-05-20 | End: 2025-05-20

## 2025-05-20 RX ORDER — HEPARIN SODIUM 10000 [USP'U]/100ML
3-20 INJECTION, SOLUTION INTRAVENOUS
Status: DISCONTINUED | OUTPATIENT
Start: 2025-05-20 | End: 2025-05-20

## 2025-05-20 RX ORDER — ASPIRIN 81 MG/1
324 TABLET, CHEWABLE ORAL ONCE
Status: COMPLETED | OUTPATIENT
Start: 2025-05-20 | End: 2025-05-20

## 2025-05-20 RX ORDER — NITROGLYCERIN 0.4 MG/1
0.4 TABLET SUBLINGUAL ONCE
Status: DISCONTINUED | OUTPATIENT
Start: 2025-05-20 | End: 2025-05-20

## 2025-05-20 RX ORDER — NITROGLYCERIN 0.4 MG/1
0.4 TABLET SUBLINGUAL
Status: DISCONTINUED | OUTPATIENT
Start: 2025-05-20 | End: 2025-05-21 | Stop reason: HOSPADM

## 2025-05-20 RX ORDER — LIDOCAINE HYDROCHLORIDE 10 MG/ML
INJECTION, SOLUTION EPIDURAL; INFILTRATION; INTRACAUDAL; PERINEURAL CODE/TRAUMA/SEDATION MEDICATION
Status: DISCONTINUED | OUTPATIENT
Start: 2025-05-20 | End: 2025-05-20 | Stop reason: HOSPADM

## 2025-05-20 RX ADMIN — HEPARIN SODIUM 11.1 UNITS/KG/HR: 10000 INJECTION, SOLUTION INTRAVENOUS at 12:39

## 2025-05-20 RX ADMIN — HEPARIN SODIUM 4000 UNITS: 1000 INJECTION, SOLUTION INTRAVENOUS; SUBCUTANEOUS at 12:33

## 2025-05-20 RX ADMIN — ASPIRIN 324 MG: 81 TABLET, CHEWABLE ORAL at 11:39

## 2025-05-20 RX ADMIN — IOHEXOL 100 ML: 350 INJECTION, SOLUTION INTRAVENOUS at 11:59

## 2025-05-20 RX ADMIN — TICAGRELOR 180 MG: 90 TABLET ORAL at 13:44

## 2025-05-20 RX ADMIN — METOPROLOL TARTRATE 25 MG: 25 TABLET, FILM COATED ORAL at 12:08

## 2025-05-20 RX ADMIN — TICAGRELOR 90 MG: 90 TABLET ORAL at 20:12

## 2025-05-20 RX ADMIN — NITROGLYCERIN 0.4 MG: 0.4 TABLET SUBLINGUAL at 12:44

## 2025-05-20 RX ADMIN — PERFLUTREN 0.6 ML/MIN: 6.52 INJECTION, SUSPENSION INTRAVENOUS at 13:40

## 2025-05-20 RX ADMIN — ATORVASTATIN CALCIUM 80 MG: 80 TABLET, FILM COATED ORAL at 12:08

## 2025-05-20 NOTE — PLAN OF CARE
Problem: PAIN - ADULT  Goal: Verbalizes/displays adequate comfort level or baseline comfort level  Description: Interventions:  - Encourage patient to monitor pain and request assistance  - Assess pain using appropriate pain scale  - Administer analgesics as ordered based on type and severity of pain and evaluate response  - Implement non-pharmacological measures as appropriate and evaluate response  - Consider cultural and social influences on pain and pain management  - Notify physician/advanced practitioner if interventions unsuccessful or patient reports new pain  - Educate patient/family on pain management process including their role and importance of  reporting pain   - Provide non-pharmacologic/complimentary pain relief interventions  Outcome: Progressing     Problem: INFECTION - ADULT  Goal: Absence or prevention of progression during hospitalization  Description: INTERVENTIONS:  - Assess and monitor for signs and symptoms of infection  - Monitor lab/diagnostic results  - Monitor all insertion sites, i.e. indwelling lines, tubes, and drains  - Monitor endotracheal if appropriate and nasal secretions for changes in amount and color  - [place] appropriate cooling/warming therapies per order  - Administer medications as ordered  - Instruct and encourage patient and family to use good hand hygiene technique  - Identify and instruct in appropriate isolation precautions for identified infection/condition  Outcome: Progressing  Goal: Absence of fever/infection during neutropenic period  Description: INTERVENTIONS:  - Monitor WBC  - Perform strict hand hygiene  - Limit to healthy visitors only  - No plants, dried, fresh or silk flowers with sotelo in patient room  Outcome: Progressing     Problem: SAFETY ADULT  Goal: Patient will remain free of falls  Description: INTERVENTIONS:  - Educate patient/family on patient safety including physical limitations  - Instruct patient to call for assistance with activity   -  Consider consulting OT/PT to assist with strengthening/mobility based on AM PAC & JH-HLM score  - Consult OT/PT to assist with strengthening/mobility   - Keep Call bell within reach  - Keep bed low and locked with side rails adjusted as appropriate  - Keep care items and personal belongings within reach  - Initiate and maintain comfort rounds  - Make Fall Risk Sign visible to staff  - Offer Toileting every 2 Hours, in advance of need  - Initiate/Maintain BED alarm  - Obtain necessary fall risk management equipment: ALARMS  - Apply yellow socks and bracelet for high fall risk patients  - Consider moving patient to room near nurses station  Outcome: Progressing  Goal: Maintain or return to baseline ADL function  Description: INTERVENTIONS:  -  Assess patient's ability to carry out ADLs; assess patient's baseline for ADL function and identify physical deficits which impact ability to perform ADLs (bathing, care of mouth/teeth, toileting, grooming, dressing, etc.)  - Assess/evaluate cause of self-care deficits   - Assess range of motion  - Assess patient's mobility; develop plan if impaired  - Assess patient's need for assistive devices and provide as appropriate  - Encourage maximum independence but intervene and supervise when necessary  - Involve family in performance of ADLs  - Assess for home care needs following discharge   - Consider OT consult to assist with ADL evaluation and planning for discharge  - Provide patient education as appropriate  - Monitor functional capacity and physical performance, use of AM PAC & JH-HLM   - Monitor gait, balance and fatigue with ambulation    Outcome: Progressing  Goal: Maintains/Returns to pre admission functional level  Description: INTERVENTIONS:  - Perform AM-PAC 6 Click Basic Mobility/ Daily Activity assessment daily.  - Set and communicate daily mobility goal to care team and patient/family/caregiver.   - Collaborate with rehabilitation services on mobility goals if  consulted  - Perform Range of Motion 3 times a day.  - Reposition patient every 2 hours.  - Dangle patient 3 times a day  - Stand patient 3 times a day  - Ambulate patient 3 times a day  - Out of bed to chair 3 times a day   - Out of bed for meals 3 times a day  - Out of bed for toileting  - Record patient progress and toleration of activity level   Outcome: Progressing     Problem: DISCHARGE PLANNING  Goal: Discharge to home or other facility with appropriate resources  Description: INTERVENTIONS:  - Identify barriers to discharge w/patient and caregiver  - Arrange for needed discharge resources and transportation as appropriate  - Identify discharge learning needs (meds, wound care, etc.)  - Arrange for interpretive services to assist at discharge as needed  - Refer to Case Management Department for coordinating discharge planning if the patient needs post-hospital services based on physician/advanced practitioner order or complex needs related to functional status, cognitive ability, or social support system  Outcome: Progressing     Problem: Knowledge Deficit  Goal: Patient/family/caregiver demonstrates understanding of disease process, treatment plan, medications, and discharge instructions  Description: Complete learning assessment and assess knowledge base.  Interventions:  - Provide teaching at level of understanding  - Provide teaching via preferred learning methods  Outcome: Progressing

## 2025-05-20 NOTE — ED PROVIDER NOTES
Time reflects when diagnosis was documented in both MDM as applicable and the Disposition within this note       Time User Action Codes Description Comment    5/20/2025 12:21 PM Arelis Smith Add [R79.89] Elevated troponin     5/20/2025 12:21 PM Arelis Smith Add [R07.9] Chest pain     5/20/2025 12:28 PM Arelis Smith Add [R93.5] Abnormal CT of the abdomen     5/20/2025 12:28 PM Arelis Smith Modify [R93.5] Abnormal CT of the abdomen Moderate stenosis at the origin of the celiac axis with poststenotic dilatation, correlate clinically for median arcuate ligament syndrome.    5/20/2025 12:28 PM Arelis Smith Add [I77.4] Stenosis of celiac artery (HCC)     5/20/2025 12:28 PM Arelis Smith Modify [I77.4] Stenosis of celiac artery (HCC) Moderate stenosis at the origin of the celiac axis with poststenotic dilatation, correlate clinically for median arcuate ligament syndrome.    5/20/2025 12:28 PM Arelis Smith Remove [R93.5] Abnormal CT of the abdomen Moderate stenosis at the origin of the celiac axis with poststenotic dilatation, correlate clinically for median arcuate ligament syndrome.    5/20/2025  1:27 PM Odilon Zepeda Add [I21.4] NSTEMI (non-ST elevated myocardial infarction) (HCC)     5/20/2025  2:52 PM Lissa Fields Modify [R79.89] Elevated troponin     5/20/2025  2:52 PM Lissa Fields Add [I25.10] Coronary artery disease involving native coronary artery           ED Disposition       ED Disposition   Admit    Condition   Stable    Date/Time   Tue May 20, 2025 12:21 PM    Comment   Case was discussed with MORAIMA and the patient's admission status was agreed to be Admission Status: inpatient status to the service of Dr. Zepeda .               Assessment & Plan       Medical Decision Making  58 yo M with CP/epigastric pain/upper back pain- EKG to r/o STEMI/dysrhythmia/abn intervals/ischemic changes, troponin to eval for ischemia, CBC to assess for leukocytosis/anemia, CMP to assess for elevated LFTs/JANIS/electrolyte  derangements, CXR to r/o PTX/PNA/effusion/CHF, lipase to r/o pancreatitis    Elevated troponin/EKG changes- discussed with Cardiology who evaluated pt in ER- started ASA, nitro, Metoprolol, statin, Heparin  Pt taken to cath lab from ER  Admitted to [facility]    Critical Care Time Statement: Upon my evaluation, this patient had a high probability of imminent or life-threatening deterioration due to MI, which required my direct attention, intervention, and personal management.  I spent a total of 32 minutes directly providing critical care services, including interpretation of complex medical databases, evaluating for the presence of life-threatening injuries or illnesses, management of organ system failure(s) , complex medical decision making (to support/prevent further life-threatening deterioration)., and interpretation of hemodynamic data, MI requiring ASA/Heparin/Nitro, discussions with Cardiology, frequent reassessments, cath lab. This time is exclusive of procedures, teaching, treating other patients, family meetings, and any prior time recorded by providers other than myself.         Problems Addressed:  Chest pain: acute illness or injury  Elevated troponin: acute illness or injury    Amount and/or Complexity of Data Reviewed  External Data Reviewed: labs, radiology, ECG and notes.  Labs: ordered. Decision-making details documented in ED Course.  Radiology: ordered and independent interpretation performed. Decision-making details documented in ED Course.  ECG/medicine tests: ordered and independent interpretation performed. Decision-making details documented in ED Course.  Discussion of management or test interpretation with external provider(s): Cardiology (Dr. Trevino)    Risk  OTC drugs.  Prescription drug management.  Decision regarding hospitalization.        ED Course as of 05/20/25 1916   Tue May 20, 2025   1131 hs TnI 0hr(!): 2,321   1138 EKG independently interpreted by myself:  NSR @ 70 bpm, nonspecific st  changes   1201 CTA independently interpreted by myself; no dissection, pending radiologist read   1202 Discussed with Cardiology, Dr. Trevino- recommends, ASA, Heparin, Atorvastatin, oral BB   1216 Blood Pressure(!): 190/108  BP now increasing- cardiology recc nitro 3 sl tabs then paste   1305 Going for cath this afternoon       Medications   nitroglycerin (NITROSTAT) SL tablet 0.4 mg (0.4 mg Sublingual Given 5/20/25 1244)   aspirin chewable tablet 324 mg (324 mg Oral Given 5/20/25 1139)   atorvastatin (LIPITOR) tablet 80 mg (80 mg Oral Given 5/20/25 1208)   metoprolol tartrate (LOPRESSOR) tablet 25 mg (25 mg Oral Given 5/20/25 1208)   iohexol (OMNIPAQUE) 350 MG/ML injection (SINGLE-DOSE) 100 mL (100 mL Intravenous Given 5/20/25 1159)   heparin (porcine) injection 4,000 Units (4,000 Units Intravenous Given 5/20/25 1233)   ticagrelor (BRILINTA) tablet 180 mg (180 mg Oral Given 5/20/25 1344)   perflutren lipid microsphere (DEFINITY) injection (0.6 mL/min Intravenous Given 5/20/25 1340)       ED Risk Strat Scores   HEART Risk Score      Flowsheet Row Most Recent Value   Heart Score Risk Calculator    History 1 Filed at: 05/20/2025 1137   ECG 1 Filed at: 05/20/2025 1137   Age 1 Filed at: 05/20/2025 1137   Risk Factors 1 Filed at: 05/20/2025 1137   Troponin 2 Filed at: 05/20/2025 1137   HEART Score 6 Filed at: 05/20/2025 1137          HEART Risk Score      Flowsheet Row Most Recent Value   Heart Score Risk Calculator    History 1 Filed at: 05/20/2025 1137   ECG 1 Filed at: 05/20/2025 1137   Age 1 Filed at: 05/20/2025 1137   Risk Factors 1 Filed at: 05/20/2025 1137   Troponin 2 Filed at: 05/20/2025 1137   HEART Score 6 Filed at: 05/20/2025 1137                      No data recorded        SBIRT 20yo+      Flowsheet Row Most Recent Value   Initial Alcohol Screen: US AUDIT-C     1. How often do you have a drink containing alcohol? 0 Filed at: 05/20/2025 1014   2. How many drinks containing alcohol do you have on a typical day  you are drinking?  0 Filed at: 05/20/2025 1014   3a. Male UNDER 65: How often do you have five or more drinks on one occasion? 0 Filed at: 05/20/2025 1014   3b. FEMALE Any Age, or MALE 65+: How often do you have 4 or more drinks on one occassion? 0 Filed at: 05/20/2025 1014   Audit-C Score 0 Filed at: 05/20/2025 1014   DAVI: How many times in the past year have you...    Used an illegal drug or used a prescription medication for non-medical reasons? Never Filed at: 05/20/2025 1014                            History of Present Illness       Chief Complaint   Patient presents with    Chest Pain     Pt reports eating lunch yesterday and had CP.        Past Medical History:   Diagnosis Date    COVID-19     No pertinent past medical history       Past Surgical History:   Procedure Laterality Date    COLONOSCOPY  02/10/2017    EYE SURGERY  2002    Lasik    HERNIA REPAIR      age 6     VASECTOMY        Family History   Problem Relation Age of Onset    No Known Problems Mother     Hyperlipidemia Father     COPD Father     No Known Problems Sister     No Known Problems Brother       Social History[1]   E-Cigarette/Vaping    E-Cigarette Use Never User       E-Cigarette/Vaping Substances    Nicotine No     THC No     CBD No     Flavoring No     Other No     Unknown No       I have reviewed and agree with the history as documented.     60 yo M h/o HTN, HLD; presenting for evaluation of CP.    Sx started yesterday afternoon after eating lunch. CP with radiation to shoulders and back between shoulder blades. Sat down/drank water and eased up. Still present, but much less severe. Ate toast for dinner/small breakfast without recurrence of the severe pain. No history of similar before.  ROS+ for abdominal cramping    Denies fevers, chills, SOB, N/V/D/C, leg pain/swelling  No known CAD  Nonsmoker, +ETOH            Per independent review of external records:   - 2022 CT coronary calcium score: Total coronary calcium score equals 30.  For more useful information regarding the prognostic significance of the calcium score, please consult the calculator at the website http://[email or web address]/CACReference.aspx.     Review of Systems        Objective       ED Triage Vitals   Temperature Pulse Blood Pressure Respirations SpO2 Patient Position - Orthostatic VS   05/20/25 1015 05/20/25 1002 05/20/25 1002 05/20/25 1002 05/20/25 1002 05/20/25 1002   98.4 °F (36.9 °C) 77 (!) 190/117 18 98 % Sitting      Temp Source Heart Rate Source BP Location FiO2 (%) Pain Score    05/20/25 1015 05/20/25 1002 05/20/25 1002 -- 05/20/25 1419    Oral Monitor Right arm  No Pain      Vitals      Date and Time Temp Pulse SpO2 Resp BP Pain Score FACES Pain Rating User   05/20/25 1815 -- 80 -- 16 163/100 -- --    05/20/25 1715 -- 77 -- 16 158/102 -- --    05/20/25 1700 -- -- -- -- -- No Pain -- CR   05/20/25 1645 -- 83 -- 16 155/95 -- --    05/20/25 1615 -- 71 -- 16 150/94 -- --    05/20/25 1600 -- 65 -- 16 129/91 -- --    05/20/25 1545 -- 74 -- 16 149/95 -- --    05/20/25 1530 -- 66 98 % 16 116/78 -- --    05/20/25 1419 -- -- -- -- -- No Pain -- SPA   05/20/25 1345 -- 74 98 % 16 143/99 -- --    05/20/25 1332 -- 74 -- -- 143/99 -- --    05/20/25 1230 -- 68 100 % 14 166/105 -- --    05/20/25 1208 -- 74 -- -- 190/108 -- --    05/20/25 1145 -- 72 97 % 18 178/110 -- --    05/20/25 1100 -- 74 97 % 18 156/100 -- --    05/20/25 1045 -- 71 97 % 18 162/99 -- --    05/20/25 1030 -- 69 97 % 17 158/99 -- --    05/20/25 1015 98.4 °F (36.9 °C) -- -- -- -- -- --    05/20/25 1002 -- 77 98 % 18 190/117 -- --             Physical Exam  Vitals and nursing note reviewed.   Constitutional:       General: He is not in acute distress.     Appearance: Normal appearance. He is well-developed.   HENT:      Head: Normocephalic and atraumatic.      Nose: Nose normal.     Eyes:      Extraocular Movements: Extraocular movements intact.      Conjunctiva/sclera: Conjunctivae  normal.       Cardiovascular:      Rate and Rhythm: Normal rate and regular rhythm.      Heart sounds: Normal heart sounds.      Comments: No rash/skin changes, no chest wall ttp  Pulmonary:      Effort: Pulmonary effort is normal. No respiratory distress.      Breath sounds: Normal breath sounds. No stridor. No wheezing or rales.   Chest:      Chest wall: No tenderness.   Abdominal:      General: There is no distension.      Palpations: Abdomen is soft.      Tenderness: There is no abdominal tenderness. There is no guarding or rebound.     Musculoskeletal:         General: No swelling, tenderness or deformity.      Cervical back: Normal range of motion and neck supple.      Comments: No calf swelling/ttp, no peripheral edema     Skin:     General: Skin is warm and dry.      Findings: No rash.     Neurological:      General: No focal deficit present.      Mental Status: He is alert and oriented to person, place, and time.      Motor: No abnormal muscle tone.      Coordination: Coordination normal.     Psychiatric:         Thought Content: Thought content normal.         Judgment: Judgment normal.         Results Reviewed       Procedure Component Value Units Date/Time    HS Troponin I 2hr [[id number]]  (Abnormal) Collected: 05/20/25 1242    Lab Status: Final result Specimen: Blood from Arm, Left Updated: 05/20/25 1313     hs TnI 2hr 3,268 ng/L      Delta 2hr hsTnI 947 ng/L     APTT [[id number]]  (Normal) Collected: 05/20/25 1143    Lab Status: Final result Specimen: Blood from Arm, Left Updated: 05/20/25 1211     PTT 27 seconds     Protime-INR [[id number]]  (Normal) Collected: 05/20/25 1143    Lab Status: Final result Specimen: Blood from Arm, Left Updated: 05/20/25 1211     Protime 13.6 seconds      INR 1.02    Narrative:      INR Therapeutic Range    Indication                                             INR Range      Atrial Fibrillation                                               2.0-3.0  Hypercoagulable State                                     2.0.2.3  Left Ventricular Asist Device                            2.0-3.0  Mechanical Heart Valve                                  -    Aortic(with afib, MI, embolism, HF, LA enlargement,    and/or coagulopathy)                                     2.0-3.0 (2.5-3.5)     Mitral                                                             2.5-3.5  Prosthetic/Bioprosthetic Heart Valve               2.0-3.0  Venous thromboembolism (VTE: VT, PE        2.0-3.0    HS Troponin I 4hr [[id number]]     Lab Status: No result Specimen: Blood     HS Troponin 0hr (reflex protocol) [[id number]]  (Abnormal) Collected: 05/20/25 1045    Lab Status: Final result Specimen: Blood from Arm, Right Updated: 05/20/25 1120     hs TnI 0hr 2,321 ng/L     Comprehensive metabolic panel [[id number]] Collected: 05/20/25 1045    Lab Status: Final result Specimen: Blood from Arm, Right Updated: 05/20/25 1117     Sodium 140 mmol/L      Potassium 3.9 mmol/L      Chloride 104 mmol/L      CO2 29 mmol/L      ANION GAP 7 mmol/L      BUN 15 mg/dL      Creatinine 1.04 mg/dL      Glucose 111 mg/dL      Calcium 9.6 mg/dL      AST 39 U/L      ALT 23 U/L      Alkaline Phosphatase 56 U/L      Total Protein 6.7 g/dL      Albumin 4.4 g/dL      Total Bilirubin 0.58 mg/dL      eGFR 78 ml/min/1.73sq m     Narrative:      National Kid[employer] Foundation guidelines for Chronic Kidney Disease (CKD):     Stage 1 with normal or high GFR (GFR > 90 mL/min/1.73 square meters)    Stage 2 Mild CKD (GFR = 60-89 mL/min/1.73 square meters)    Stage 3A Moderate CKD (GFR = 45-59 mL/min/1.73 square meters)    Stage 3B Moderate CKD (GFR = 30-44 mL/min/1.73 square meters)    Stage 4 Severe CKD (GFR = 15-29 mL/min/1.73 square meters)    Stage 5 End Stage CKD (GFR <15 mL/min/1.73 square meters)  Note: GFR calculation is accurate only with a steady state creatinine    Lipase [[id number]]  (Normal) Collected: 05/20/25 1045    Lab Status: Final result Specimen:  Blood from Arm, Right Updated: 05/20/25 1117     Lipase 26 u/L     CBC and differential [[id number]]  (Abnormal) Collected: 05/20/25 1045    Lab Status: Final result Specimen: Blood from Arm, Right Updated: 05/20/25 1106     WBC 3.81 Thousand/uL      RBC 5.28 Million/uL      Hemoglobin 15.1 g/dL      Hematocrit 45.6 %      MCV 86 fL      MCH 28.6 pg      MCHC 33.1 g/dL      RDW 12.4 %      MPV 9.9 fL      Platelets 230 Thousands/uL      Segmented % 66 %      Lymphocytes % 25 %      Monocytes % 8 %      Eosinophils Relative 1 %      Basophils Relative 0 %             CTA dissection protocol chest/abdomen/pelvis   ED Interpretation by Arelis Smith DO (05/20 1235)   IMPRESSION:     1.  No aortic dissection or intramural hematoma.  2.  No acute process in the chest, abdomen, or pelvis.  3.  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  4.  Scattered coronary artery disease.        Final Interpretation by Nate Brown MD (05/20 1218)      1.  No aortic dissection or intramural hematoma.   2.  No acute process in the chest, abdomen, or pelvis.   3.  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   4.  Scattered coronary artery disease.               Workstation performed: [id number]         XR chest 2 views   Final Interpretation by Caroline Malik MD (05/20 1343)      No acute cardiopulmonary disease.            Workstation performed: [id number]             Procedures    ED Medication and Procedure Management   Prior to Admission Medications   Prescriptions Last Dose Informant Patient Reported? Taking?   Coenzyme Q10 (CO Q 10 PO)  Self Yes No   Sig: Take by mouth daily   Cyanocobalamin (VITAMIN B-12 PO)  Self Yes No   Sig: Take by mouth daily   Multiple Vitamin (MULTIVITAMIN) capsule  Self Yes No   Sig: Take 1 capsule by  mouth daily   Omega-3 Fatty Acids (FISH OIL) 1,000 mg  Self Yes No   Sig: Take 1,000 mg by mouth daily   VITAMIN D PO  Self Yes No   Sig: Take by mouth daily   albuterol (ProAir HFA) 90 mcg/act inhaler  Self No No   Sig: Inhale 2 puffs every 6 (six) hours as needed for wheezing   cetirizine (ZyrTEC) 10 MG chewable tablet  Self No No   Sig: Chew 1 tablet (10 mg total) daily   montelukast (SINGULAIR) 10 mg tablet  Self No No   Sig: Take 1 tablet (10 mg total) by mouth daily at bedtime   pravastatin (PRAVACHOL) 20 mg tablet   No No   Sig: TAKE 1 TABLET BY MOUTH ONCE DAILY AT BEDTIME   sodium chloride (OCEAN) 0.65 % nasal spray  Self No No   Si spray into each nostril 4 (four) times a day   Patient taking differently: 1 spray into each nostril as needed      Facility-Administered Medications: None     Current Discharge Medication List        CONTINUE these medications which have NOT CHANGED    Details   albuterol (ProAir HFA) 90 mcg/act inhaler Inhale 2 puffs every 6 (six) hours as needed for wheezing  Qty: 18 g, Refills: 2    Comments: Substitution to a formulary equivalent within the same pharmaceutical class is authorized.  Associated Diagnoses: Mild asthma without complication, unspecified whether persistent      cetirizine (ZyrTEC) 10 MG chewable tablet Chew 1 tablet (10 mg total) daily    Associated Diagnoses: Mild intermittent asthma without complication      Coenzyme Q10 (CO Q 10 PO) Take by mouth daily      Cyanocobalamin (VITAMIN B-12 PO) Take by mouth daily      montelukast (SINGULAIR) 10 mg tablet Take 1 tablet (10 mg total) by mouth daily at bedtime    Associated Diagnoses: Mild intermittent asthma without complication      Multiple Vitamin (MULTIVITAMIN) capsule Take 1 capsule by mouth daily      Omega-3 Fatty Acids (FISH OIL) 1,000 mg Take 1,000 mg by mouth daily      pravastatin (PRAVACHOL) 20 mg tablet TAKE 1 TABLET BY MOUTH ONCE DAILY AT BEDTIME  Qty: 90 tablet, Refills: 1    Associated Diagnoses:  Mixed hyperlipidemia      sodium chloride (OCEAN) 0.65 % nasal spray 1 spray into each nostril 4 (four) times a day  Qty: 60 mL, Refills: 0    Associated Diagnoses: Subacute cough      VITAMIN D PO Take by mouth daily             ED SEPSIS DOCUMENTATION   Time reflects when diagnosis was documented in both MDM as applicable and the Disposition within this note       Time User Action Codes Description Comment    5/20/2025 12:21 PM Arelis Smith Add [R79.89] Elevated troponin     5/20/2025 12:21 PM Arelis Smith Add [R07.9] Chest pain     5/20/2025 12:28 PM Arelis Smith Add [R93.5] Abnormal CT of the abdomen     5/20/2025 12:28 PM Arelis Smith Modify [R93.5] Abnormal CT of the abdomen Moderate stenosis at the origin of the celiac axis with poststenotic dilatation, correlate clinically for median arcuate ligament syndrome.    5/20/2025 12:28 PM Arelis Smith Add [I77.4] Stenosis of celiac artery (HCC)     5/20/2025 12:28 PM Arelis Smith Modify [I77.4] Stenosis of celiac artery (HCC) Moderate stenosis at the origin of the celiac axis with poststenotic dilatation, correlate clinically for median arcuate ligament syndrome.    5/20/2025 12:28 PM Arelis Smith Remove [R93.5] Abnormal CT of the abdomen Moderate stenosis at the origin of the celiac axis with poststenotic dilatation, correlate clinically for median arcuate ligament syndrome.    5/20/2025  1:27 PM Odilon Zepeda Add [I21.4] NSTEMI (non-ST elevated myocardial infarction) (HCC)     5/20/2025  2:52 PM Lissa Fields Modify [R79.89] Elevated troponin     5/20/2025  2:52 PM Lissa Fields Add [I25.10] Coronary artery disease involving native coronary artery                      [1]   Social History  Tobacco Use    Smoking status: Never    Smokeless tobacco: Never   Vaping Use    Vaping status: Never Used   Substance Use Topics    Alcohol use: Yes     Alcohol/week: 7.0 standard drinks of alcohol     Types: 5 Glasses of wine, 2 Cans of beer per week     Comment: social     Drug use: No        Arelis Smith DO  05/20/25 1916

## 2025-05-20 NOTE — PLAN OF CARE
Problem: PAIN - ADULT  Goal: Verbalizes/displays adequate comfort level or baseline comfort level  Description: Interventions:  - Encourage patient to monitor pain and request assistance  - Assess pain using appropriate pain scale  - Administer analgesics as ordered based on type and severity of pain and evaluate response  - Implement non-pharmacological measures as appropriate and evaluate response  - Consider cultural and social influences on pain and pain management  - Notify physician/advanced practitioner if interventions unsuccessful or patient reports new pain  - Educate patient/family on pain management process including their role and importance of  reporting pain   - Provide non-pharmacologic/complimentary pain relief interventions  Outcome: Progressing     Problem: INFECTION - ADULT  Goal: Absence or prevention of progression during hospitalization  Description: INTERVENTIONS:  - Assess and monitor for signs and symptoms of infection  - Monitor lab/diagnostic results  - Monitor all insertion sites, i.e. indwelling lines, tubes, and drains  - Monitor endotracheal if appropriate and nasal secretions for changes in amount and color  - Celoron appropriate cooling/warming therapies per order  - Administer medications as ordered  - Instruct and encourage patient and family to use good hand hygiene technique  - Identify and instruct in appropriate isolation precautions for identified infection/condition  Outcome: Progressing  Goal: Absence of fever/infection during neutropenic period  Description: INTERVENTIONS:  - Monitor WBC  - Perform strict hand hygiene  - Limit to healthy visitors only  - No plants, dried, fresh or silk flowers with sotelo in patient room  Outcome: Progressing     Problem: SAFETY ADULT  Goal: Patient will remain free of falls  Description: INTERVENTIONS:  - Educate patient/family on patient safety including physical limitations  - Instruct patient to call for assistance with activity   -  Consider consulting OT/PT to assist with strengthening/mobility based on AM PAC & JH-HLM score  - Consult OT/PT to assist with strengthening/mobility   - Keep Call bell within reach  - Keep bed low and locked with side rails adjusted as appropriate  - Keep care items and personal belongings within reach  - Initiate and maintain comfort rounds  - Make Fall Risk Sign visible to staff  - Offer Toileting every 3 Hours, in advance of need  - Initiate/Maintain bed alarm  - Obtain necessary fall risk management equipment: alarm   - Apply yellow socks and bracelet for high fall risk patients  - Consider moving patient to room near nurses station  Outcome: Progressing  Goal: Maintain or return to baseline ADL function  Description: INTERVENTIONS:  -  Assess patient's ability to carry out ADLs; assess patient's baseline for ADL function and identify physical deficits which impact ability to perform ADLs (bathing, care of mouth/teeth, toileting, grooming, dressing, etc.)  - Assess/evaluate cause of self-care deficits   - Assess range of motion  - Assess patient's mobility; develop plan if impaired  - Assess patient's need for assistive devices and provide as appropriate  - Encourage maximum independence but intervene and supervise when necessary  - Involve family in performance of ADLs  - Assess for home care needs following discharge   - Consider OT consult to assist with ADL evaluation and planning for discharge  - Provide patient education as appropriate  - Monitor functional capacity and physical performance, use of AM PAC & JH-HLM   - Monitor gait, balance and fatigue with ambulation    Outcome: Progressing  Goal: Maintains/Returns to pre admission functional level  Description: INTERVENTIONS:  - Perform AM-PAC 6 Click Basic Mobility/ Daily Activity assessment daily.  - Set and communicate daily mobility goal to care team and patient/family/caregiver.   - Collaborate with rehabilitation services on mobility goals if  consulted  - Perform Range of Motion 3 times a day.  - Reposition patient every 3 hours.  - Dangle patient 3 times a day  - Stand patient 3 times a day  - Ambulate patient 3 times a day  - Out of bed to chair 3 times a day   - Out of bed for meals 3 times a day  - Out of bed for toileting  - Record patient progress and toleration of activity level   Outcome: Progressing     Problem: DISCHARGE PLANNING  Goal: Discharge to home or other facility with appropriate resources  Description: INTERVENTIONS:  - Identify barriers to discharge w/patient and caregiver  - Arrange for needed discharge resources and transportation as appropriate  - Identify discharge learning needs (meds, wound care, etc.)  - Arrange for interpretive services to assist at discharge as needed  - Refer to Case Management Department for coordinating discharge planning if the patient needs post-hospital services based on physician/advanced practitioner order or complex needs related to functional status, cognitive ability, or social support system  Outcome: Progressing     Problem: Knowledge Deficit  Goal: Patient/family/caregiver demonstrates understanding of disease process, treatment plan, medications, and discharge instructions  Description: Complete learning assessment and assess knowledge base.  Interventions:  - Provide teaching at level of understanding  - Provide teaching via preferred learning methods  Outcome: Progressing

## 2025-05-20 NOTE — ASSESSMENT & PLAN NOTE
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   Vascular surgery consulted. From my discussion with their team so far, no surgical intervention indicated at this time.

## 2025-05-20 NOTE — DISCHARGE INSTR - AVS FIRST PAGE
Please take 600 mg of Plavix tonight then start taking 75 mg daily thereafter.     1. Please see the post angioplasty discharge instructions.   No heavy lifting, greater than 10 lbs. or strenuous activity for 5 days.  Follow angioplasty discharge instructions.    2.Remove band aid tomorrow.  Shower and wash area- wrist gently with soap and water- beginning tomorrow. Rinse and pat dry.  Apply new water seal band aid.  Repeat this process for 5 days. No powders, creams lotions or antibiotic ointments  for 5 days.  No tub baths, hot tubs or swimming for 5 days.     3. Call [facility]'s Cardiology Office ([phone]) if you develop a fever, redness or drainage at your wrist access site.      4. No driving for 2 days    5. Do not stop aspirin or Brilinta (Ticagrelor) any reason without a cardiologist’s consent, or the stent could block up and cause a heart attack.    6. Stent card and book.

## 2025-05-20 NOTE — H&P
H&P - Hospitalist   Name: Odilon Hess 59 y.o. male I MRN: [id number]  Unit/Bed#: ED-13 I Date of Admission: 5/20/2025   Date of Service: 5/20/2025 I Hospital Day: 0     Assessment & Plan  NSTEMI (non-ST elevated myocardial infarction) (HCC)  59 year old male with a history of hyperlipidemia presented to our institution due to chest discomfort of one day duration. Described it as substernal in location, non radiating. No clear association with exertion, nor at rest. Troponin levels currently at 2321. Admission was requested for NSTEMI management.  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   Vascular surgery consulted. From my discussion with their team so far, no surgical intervention indicated at this time.      VTE Pharmacologic Prophylaxis:   Moderate Risk (Score 3-4) - Pharmacological DVT Prophylaxis Ordered: heparin drip.  Code Status: No Order full code  Discussion with family: Updated  (wife) at bedside.    Anticipated Length of Stay: Patient will be admitted on an inpatient basis with an anticipated length of stay of greater than 2 midnights secondary to nstemi, cards eval, heparin drip.    History of Present Illness   Chief Complaint: chest pain    Odilon Hess is a 59 y.o. male with a PMH of hyperlipidemia who presents with chest pain.    Patient was in his usual state of health yesterday when he started developing substernal chest discomfort around noon, it was non-radiating. He did not feel any significant worsening with exertion, nor with rest. It improved throughout the day, but still persisted. He was  hypertensive last night. As a result of recurrence of his pain he presented to our ED for further evaluation where he was found to have troponin at 2321 and has since then uptrended to 3268. Cardiology was consulted and admission was requested for NSTEMI management.     Abnormal CT findings discussed with vascular surgery. He denies any post-prandial pain or recent significant weight loss.    Review of Systems   Constitutional:  Negative for chills and fever.   HENT:  Negative for congestion.    Respiratory:  Negative for cough, shortness of breath and wheezing.    Cardiovascular:  Positive for chest pain. Negative for palpitations.   Gastrointestinal:  Positive for abdominal pain. Negative for nausea and vomiting.   Skin:  Negative for color change and pallor.   Neurological:  Negative for weakness and headaches.       Historical Information   Past Medical History:   Diagnosis Date    COVID-19     No pertinent past medical history      Past Surgical History:   Procedure Laterality Date    COLONOSCOPY  02/10/2017    EYE SURGERY  2002    Lasik    HERNIA REPAIR      age 6     VASECTOMY       Social History     Tobacco Use    Smoking status: Never    Smokeless tobacco: Never   Vaping Use    Vaping status: Never Used   Substance and Sexual Activity    Alcohol use: Yes     Alcohol/week: 7.0 standard drinks of alcohol     Types: 5 Glasses of wine, 2 Cans of beer per week     Comment: social    Drug use: No    Sexual activity: Yes     Partners: Female     Birth control/protection: Male Sterilization     E-Cigarette/Vaping    E-Cigarette Use Never User      E-Cigarette/Vaping Substances    Nicotine No     THC No     CBD No     Flavoring No     Other No     Unknown No      Family History   Problem Relation Age of Onset    No Known Problems Mother     Hyperlipidemia Father     COPD Father     No Known Problems Sister     No Known Problems Brother      Social History:  Marital Status: /Civil Union     Meds/Allergies   I  have reviewed home medications with patient personally.  Prior to Admission medications    Medication Sig Start Date End Date Taking? Authorizing Provider   albuterol (ProAir HFA) 90 mcg/act inhaler Inhale 2 puffs every 6 (six) hours as needed for wheezing 11/22/23   Abdifatah Schmidt MD   cetirizine (ZyrTEC) 10 MG chewable tablet Chew 1 tablet (10 mg total) daily 2/14/24   Abdifatah Schmidt MD   Coenzyme Q10 (CO Q 10 PO) Take by mouth daily    Historical Provider, MD   Cyanocobalamin (VITAMIN B-12 PO) Take by mouth daily    Historical Provider, MD   montelukast (SINGULAIR) 10 mg tablet Take 1 tablet (10 mg total) by mouth daily at bedtime 2/14/24   Abdifatah Schmidt MD   Multiple Vitamin (MULTIVITAMIN) capsule Take 1 capsule by mouth daily    Historical Provider, MD   Omega-3 Fatty Acids (FISH OIL) 1,000 mg Take 1,000 mg by mouth daily    Historical Provider, MD   pravastatin (PRAVACHOL) 20 mg tablet TAKE 1 TABLET BY MOUTH ONCE DAILY AT BEDTIME 4/15/25   Abdifatah Schmidt MD   sodium chloride (OCEAN) 0.65 % nasal spray 1 spray into each nostril 4 (four) times a day  Patient taking differently: 1 spray into each nostril as needed 11/22/23   Abdifatah Schmidt MD   VITAMIN D PO Take by mouth daily    Historical Provider, MD     No Known Allergies    Objective :  Temp:  [98.4 °F (36.9 °C)] 98.4 °F (36.9 °C)  HR:  [68-77] 68  BP: (156-190)/() 166/105  Resp:  [14-18] 14  SpO2:  [97 %-100 %] 100 %  O2 Device: None (Room air)    Physical Exam  Vitals reviewed.   Constitutional:       General: He is not in acute distress.  HENT:      Head: Normocephalic.      Nose: Nose normal.      Mouth/Throat:      Mouth: Mucous membranes are moist.     Eyes:      General: No scleral icterus.      Cardiovascular:      Rate and Rhythm: Normal rate and regular rhythm.   Pulmonary:      Effort: Pulmonary effort is normal. No respiratory distress.      Breath sounds: No wheezing.   Abdominal:      General: There is no distension.      Palpations:  Abdomen is soft.      Tenderness: There is no abdominal tenderness.     Skin:     General: Skin is warm.     Neurological:      Mental Status: He is alert and oriented to person, place, and time.     Psychiatric:         Mood and Affect: Mood normal.         Behavior: Behavior normal.       Lines/Drains:            Lab Results: I have reviewed the following results:  Results from last 7 days   Lab Units 05/20/25  1045   WBC Thousand/uL 3.81*   HEMOGLOBIN g/dL 15.1   HEMATOCRIT % 45.6   PLATELETS Thousands/uL 230   SEGS PCT % 66   LYMPHO PCT % 25   MONO PCT % 8   EOS PCT % 1     Results from last 7 days   Lab Units 05/20/25  1045   SODIUM mmol/L 140   POTASSIUM mmol/L 3.9   CHLORIDE mmol/L 104   CO2 mmol/L 29   BUN mg/dL 15   CREATININE mg/dL 1.04   ANION GAP mmol/L 7   CALCIUM mg/dL 9.6   ALBUMIN g/dL 4.4   TOTAL BILIRUBIN mg/dL 0.58   ALK PHOS U/L 56   ALT U/L 23   AST U/L 39   GLUCOSE RANDOM mg/dL 111     Results from last 7 days   Lab Units 05/20/25  1143   INR  1.02         Lab Results   Component Value Date    HGBA1C 5.6 02/05/2022    HGBA1C 5.6 02/05/2022           CTA dissection protocol  XR Chest  ECG: NSR no st elevation      ** Please Note: This note has been constructed using a voice recognition system. **

## 2025-05-20 NOTE — ASSESSMENT & PLAN NOTE
59 year old male with a history of hyperlipidemia presented to our institution due to chest discomfort of one day duration. Described it as substernal in location, non radiating. No clear association with exertion, nor at rest. Troponin levels currently at 2321. Admission was requested for NSTEMI management.  Echo  Cardiology evaluation  Heparin drip  Received metoprolol and aspirin load, further optimization care of cardiology  Trend troponin  Telemetry    Results from last 7 days   Lab Units 05/20/25  1045   HS TNI 0HR ng/L 2,321*

## 2025-05-20 NOTE — CONSULTS
Cardiology Consult  05/20/25    Referring Physian: DO MORAIMA Craig    Chief Complain/Reason for Referal:     IMPRESSION/RECOMMENDATIONS/DISCUSSION:  Type I NSTEMI  Dyslipidemia  Elevated calcium score      Patient presents with 24 hours of chest pain with elevated high sensory troponin and ST segment abnormality on ECG consistent with type I NSTEMI.  He systolic blood pressure is 190 mmHg at this time.  He just received p.o. metoprolol.  He will be given sublingual nitroglycerin followed by Nitropaste.  Once his pressure is better we will start IV heparin continue aspirin and high-dose statin.  Echocardiogram  Indications, risks, benefits of cardiac authorization and possible PCI reviewed with patient who is agreeable to proceed.  Will d/w Dr. Marlow to see if cath can be done today.        ===========================================================================    HPI:  I am seeing this patient in cardiology consultation for: Chest pain    Odilon Hess is a 59 y.o. male with no prior cardiac history presents to the ER with chest pain.  He states he was at work yesterday, ate a sandwich and subsequently started feeling chest discomfort.  He initially thought it was GERD and tried antacids without resolution of his chest discomfort.  He came home about 5 hours later, around 430 to 5 PM and still had discomfort.  His wife measured his blood pressure to be in the 170s.  He fell asleep and when he woke up this morning still had chest discomfort.  He called his PCP and was prompted to go to the ER.  High sensitive troponin was 2321.  ECG revealed subtle ST segment elevation in leads I and aVL without reciprocal depression.  He is a non-smoker.  He does not exercise.  He denies any family history of CAD.  His calcium score in 2022 was 30.  At this time he feels fairly comfortable but still admits to some mild achy discomfort on his chest.  CTA chest ruled out aortic dissection but reported scattered  coronary artery disease.  Noted stenosis at the origin of the celiac axis.        Past Medical History:   Diagnosis Date    COVID-19     No pertinent past medical history          Scheduled Meds:  Current Facility-Administered Medications   Medication Dose Route Frequency Provider Last Rate    heparin (porcine)  3-20 Units/kg/hr (Order-Specific) Intravenous Titrated Arelis Marisol Hay, DO 11.1 Units/kg/hr (05/20/25 1239)    heparin (porcine)  2,000 Units Intravenous Q6H PRN Arelis Marisol Hay, DO      heparin (porcine)  4,000 Units Intravenous Q6H PRN Arelis Marisol Hay, DO      nitroglycerin  0.4 mg Sublingual Q5 Min PRN Arelis Marisol Hay, DO       Continuous Infusions:heparin (porcine), 3-20 Units/kg/hr (Order-Specific), Last Rate: 11.1 Units/kg/hr (05/20/25 1239)      PRN Meds:.  heparin (porcine)    heparin (porcine)    nitroglycerin  Allergies[1]  I reviewed the Home Medication list in the chart.     Family History   Problem Relation Age of Onset    No Known Problems Mother     Hyperlipidemia Father     COPD Father     No Known Problems Sister     No Known Problems Brother        Social History     Socioeconomic History    Marital status: /Civil Union     Spouse name: Not on file    Number of children: Not on file    Years of education: Not on file    Highest education level: Not on file   Occupational History    Not on file   Tobacco Use    Smoking status: Never    Smokeless tobacco: Never   Vaping Use    Vaping status: Never Used   Substance and Sexual Activity    Alcohol use: Yes     Alcohol/week: 7.0 standard drinks of alcohol     Types: 5 Glasses of wine, 2 Cans of beer per week     Comment: social    Drug use: No    Sexual activity: Yes     Partners: Female     Birth control/protection: Male Sterilization   Other Topics Concern    Not on file   Social History Narrative    Alcohol: No - As per [employer]     Social Drivers of Health     Financial Resource Strain: Not on file   Food Insecurity: Not on  "file   Transportation Needs: Not on file   Physical Activity: Not on file   Stress: Not on file   Social Connections: Not on file   Intimate Partner Violence: Not on file   Housing Stability: Not on file       Review of Systems - as per HPI, all others reviewed and negative  Vitals:    05/20/25 1230   BP: (!) 166/105   Pulse: 68   Resp: 14   Temp:    SpO2: 100%     I/O       None          Weight (last 2 days)       Date/Time Weight    05/20/25 1145 97.6 (215.2)            GEN: NAD, Alert  HEENT: Mucus membranes moist, pink conjunctivae  EYES: Pupils equal, sclera anicteric  NECK: No JVD/HJR, no carotid bruit  CARDIOVASCULAR: RRR, No murmur, rub, gallops S1,S2, no parasternal heave/thrill  LUNGS: Clear To auscultation bilaterally  ABDOMEN: Soft, nondistended, no hepatic systolic pulsation  EXTREMITIES/VASCULAR: No edema.  PSYCH: Normal Affect by limited examination  NEURO: Grossly intact by limited examination    HEME: No significant bleeding, bruising, petechia by limited examination  SKIN: No significant rashes by limited examination  MSK:  Normal upper extremity and trunk strengths by limited examination      EKG: Nonspecific assessment abnormality  TELE: Sinus      Results from last 7 days   Lab Units 05/20/25  1045   WBC Thousand/uL 3.81*   HEMOGLOBIN g/dL 15.1   HEMATOCRIT % 45.6   PLATELETS Thousands/uL 230   SEGS PCT % 66   MONO PCT % 8   EOS PCT % 1     Results from last 7 days   Lab Units 05/20/25  1045   POTASSIUM mmol/L 3.9   CHLORIDE mmol/L 104   CO2 mmol/L 29   BUN mg/dL 15   CREATININE mg/dL 1.04   CALCIUM mg/dL 9.6     Results from last 7 days   Lab Units 05/20/25  1045   POTASSIUM mmol/L 3.9   CHLORIDE mmol/L 104   CO2 mmol/L 29   BUN mg/dL 15   CREATININE mg/dL 1.04   CALCIUM mg/dL 9.6   ALK PHOS U/L 56   ALT U/L 23   AST U/L 39     No results found for: \"TROPONINT\"              Results from last 7 days   Lab Units 05/20/25  1143   INR  1.02               I have personally reviewed the EKG, CXR and " "Telemetry images directly.      There are no active problems to display for this patient.      Portions of the record may have been created with voice recognition software. Occasional wrong word or \"sound a like\" substitutions may have occurred due to the inherent limitations of voice recognition software. Read the chart carefully and recognize, using context, where substitutions have occurred.             [1] No Known Allergies    "

## 2025-05-20 NOTE — TELEPHONE ENCOUNTER
"FOLLOW UP: Patient agreeable for plan to get to ED. Patient is first going to something short at the school for his son. Patient understands reason to call 911 if worsens or symptoms change. Patient made known wife is a nurse that will be with him at the event.     REASON FOR CONVERSATION: Chest Pain    SYMPTOMS: Patient with sudden chest pain yesterday after eating lunch. Pain has been mild the entire time but during episode last night, wife who is an RN checked BP and BP was 170/100. BP not rechecked. Patient with shoulder blade pain along with chest pain.     OTHER: Forwarded to PCP for review.     DISPOSITION: Go to ED Now          Reason for Disposition   Pain also in shoulder(s) or arm(s) or jaw    Answer Assessment - Initial Assessment Questions  1. LOCATION: \"Where does it hurt?\"        After lunch yesterday. In \"esophagus\" Had some drinks of water seemed to help. Patient thought it was gerd. Felt better took tums, no food and took /100. Patient very fatigued.  2. RADIATION: \"Does the pain go anywhere else?\" (e.g., into neck, jaw, arms, back)      Went to back around shoulder blades.  3. ONSET: \"When did the chest pain begin?\" (Minutes, hours or days)       Yesterday   4. PATTERN: \"Does the pain come and go, or has it been constant since it started?\"  \"Does it get worse with exertion?\"       Hasn't gone away. Comes and goes.  5. DURATION: \"How long does it last\" (e.g., seconds, minutes, hours)      Last an hour  6. SEVERITY: \"How bad is the pain?\"  (e.g., Scale 1-10; mild, moderate, or severe)       1/10 currently. Last night just uncomfortable maybe 3/10.  7. CARDIAC RISK FACTORS: \"Do you have any history of heart problems or risk factors for heart disease?\" (e.g., angina, prior heart attack; diabetes, high blood pressure, high cholesterol, smoker, or strong family history of heart disease)      denies  8. PULMONARY RISK FACTORS: \"Do you have any history of lung disease?\"  (e.g., blood clots in lung, " "asthma, emphysema, birth control pills)      denies  9. CAUSE: \"What do you think is causing the chest pain?\"      reflux  10. OTHER SYMPTOMS: \"Do you have any other symptoms?\" (e.g., dizziness, nausea, vomiting, sweating, fever, difficulty breathing, cough)        denies    Protocols used: Chest Pain-Adult-[place]    "

## 2025-05-21 ENCOUNTER — TELEPHONE (OUTPATIENT)
Dept: CARDIOLOGY CLINIC | Facility: CLINIC | Age: 60
End: 2025-05-21

## 2025-05-21 ENCOUNTER — TELEPHONE (OUTPATIENT)
Age: 60
End: 2025-05-21

## 2025-05-21 VITALS
HEIGHT: 74 IN | SYSTOLIC BLOOD PRESSURE: 153 MMHG | HEART RATE: 72 BPM | TEMPERATURE: 98.7 F | OXYGEN SATURATION: 98 % | WEIGHT: 211.64 LBS | RESPIRATION RATE: 18 BRPM | BODY MASS INDEX: 27.16 KG/M2 | DIASTOLIC BLOOD PRESSURE: 104 MMHG

## 2025-05-21 LAB
ANION GAP SERPL CALCULATED.3IONS-SCNC: 7 MMOL/L (ref 4–13)
ATRIAL RATE: 70 BPM
BUN SERPL-MCNC: 12 MG/DL (ref 5–25)
CALCIUM SERPL-MCNC: 8.9 MG/DL (ref 8.4–10.2)
CHLORIDE SERPL-SCNC: 107 MMOL/L (ref 96–108)
CHOLEST SERPL-MCNC: 140 MG/DL (ref ?–200)
CO2 SERPL-SCNC: 25 MMOL/L (ref 21–32)
CREAT SERPL-MCNC: 0.9 MG/DL (ref 0.6–1.3)
ERYTHROCYTE [DISTWIDTH] IN BLOOD BY AUTOMATED COUNT: 12.7 % (ref 11.6–15.1)
GFR SERPL CREATININE-BSD FRML MDRD: 93 ML/MIN/1.73SQ M
GLUCOSE SERPL-MCNC: 105 MG/DL (ref 65–140)
HCT VFR BLD AUTO: 42.8 % (ref 36.5–49.3)
HDLC SERPL-MCNC: 38 MG/DL
HGB BLD-MCNC: 14.2 G/DL (ref 12–17)
LDLC SERPL CALC-MCNC: 71 MG/DL (ref 0–100)
MCH RBC QN AUTO: 28.7 PG (ref 26.8–34.3)
MCHC RBC AUTO-ENTMCNC: 33.2 G/DL (ref 31.4–37.4)
MCV RBC AUTO: 87 FL (ref 82–98)
P AXIS: 60 DEGREES
PLATELET # BLD AUTO: 208 THOUSANDS/UL (ref 149–390)
PMV BLD AUTO: 10 FL (ref 8.9–12.7)
POTASSIUM SERPL-SCNC: 4.1 MMOL/L (ref 3.5–5.3)
PR INTERVAL: 156 MS
QRS AXIS: 43 DEGREES
QRSD INTERVAL: 82 MS
QT INTERVAL: 414 MS
QTC INTERVAL: 447 MS
RBC # BLD AUTO: 4.95 MILLION/UL (ref 3.88–5.62)
SODIUM SERPL-SCNC: 139 MMOL/L (ref 135–147)
T WAVE AXIS: 43 DEGREES
TRIGL SERPL-MCNC: 157 MG/DL (ref ?–150)
VENTRICULAR RATE: 70 BPM
WBC # BLD AUTO: 8.57 THOUSAND/UL (ref 4.31–10.16)

## 2025-05-21 PROCEDURE — 99239 HOSP IP/OBS DSCHRG MGMT >30: CPT | Performed by: PHYSICIAN ASSISTANT

## 2025-05-21 PROCEDURE — 85027 COMPLETE CBC AUTOMATED: CPT

## 2025-05-21 PROCEDURE — 80061 LIPID PANEL: CPT | Performed by: STUDENT IN AN ORGANIZED HEALTH CARE EDUCATION/TRAINING PROGRAM

## 2025-05-21 PROCEDURE — 93010 ELECTROCARDIOGRAM REPORT: CPT | Performed by: INTERNAL MEDICINE

## 2025-05-21 PROCEDURE — 99232 SBSQ HOSP IP/OBS MODERATE 35: CPT | Performed by: INTERNAL MEDICINE

## 2025-05-21 PROCEDURE — 80048 BASIC METABOLIC PNL TOTAL CA: CPT

## 2025-05-21 RX ORDER — ATORVASTATIN CALCIUM 40 MG/1
40 TABLET, FILM COATED ORAL DAILY
Qty: 90 TABLET | Refills: 0 | Status: SHIPPED | OUTPATIENT
Start: 2025-05-21

## 2025-05-21 RX ORDER — CLOPIDOGREL 300 MG/1
600 TABLET, FILM COATED ORAL ONCE
Qty: 2 TABLET | Refills: 0 | Status: SHIPPED | OUTPATIENT
Start: 2025-05-21 | End: 2025-05-23

## 2025-05-21 RX ORDER — METOPROLOL SUCCINATE 25 MG/1
25 TABLET, EXTENDED RELEASE ORAL DAILY
Qty: 90 TABLET | Refills: 0 | Status: SHIPPED | OUTPATIENT
Start: 2025-05-22

## 2025-05-21 RX ORDER — ASPIRIN 81 MG/1
81 TABLET, CHEWABLE ORAL DAILY
Qty: 90 TABLET | Refills: 0 | Status: SHIPPED | OUTPATIENT
Start: 2025-05-22

## 2025-05-21 RX ORDER — CLOPIDOGREL BISULFATE 75 MG/1
75 TABLET ORAL DAILY
Qty: 90 TABLET | Refills: 0 | Status: SHIPPED | OUTPATIENT
Start: 2025-05-21

## 2025-05-21 RX ADMIN — METOPROLOL SUCCINATE 25 MG: 25 TABLET, EXTENDED RELEASE ORAL at 08:45

## 2025-05-21 RX ADMIN — ASPIRIN 81 MG: 81 TABLET, CHEWABLE ORAL at 08:45

## 2025-05-21 RX ADMIN — TICAGRELOR 90 MG: 90 TABLET ORAL at 08:45

## 2025-05-21 NOTE — UTILIZATION REVIEW
Initial Clinical Review    Admission: Date/Time/Statement:   Admission Orders (From admission, onward)       Ordered        05/20/25 1305  INPATIENT ADMISSION  Once                          Orders Placed This Encounter   Procedures    INPATIENT ADMISSION     Standing Status:   Standing     Number of Occurrences:   1     Level of Care:   Med Surg [16]     Estimated length of stay:   More than 2 Midnights     Certification:   I certify that inpatient services are medically necessary for this patient for a duration of greater than two midnights. See H&P and MD Progress Notes for additional information about the patient's course of treatment.     ED Arrival Information       Expected   -    Arrival   5/20/2025 09:53    Acuity   Emergent              Means of arrival   Walk-In    Escorted by   Self    Service   Hospitalist    Admission type   Emergency              Arrival complaint   chest pain             Chief Complaint   Patient presents with    Chest Pain     Pt reports eating lunch yesterday and had CP.      Initial Presentation: 59 y.o. male presents to the ED from home with c/o nonradiating, substernal CP at noon which persisted but improved, abd pain.  Had elevated BP.  PMH: HLD. In the ED he was HTN.  No pain rating.  Treated with ASA, Lipitor, Lopressor, SL NTG x 1, Heparin bolus and drip, Brilinta.  Labs - leukopenia, elevated troponins >2000.  ECG - NSR.  Imaging - no dissection; mod stenosis celiax axis w/ poststenotic dilatation; scattered coronary artery disease.  On exam no deficits.   Admitted to INPATIENT status with NSTEMI, HLD, abnormal CT Scan ( correlate clinically for median arcuate ligament syndrome) -  PLAN: Cardio consult, Echo, heparin drip, trend troponins, tele, statin, Vascular surgery Consult, no surgical intervention at this time.     Anticipated Length of Stay/Certification Statement:  Patient will be admitted on an inpatient basis with an anticipated length of stay of greater than 2  midnights secondary to nstemi, cards eval, heparin drip.     5/20 Cardio Consult - Type 1 NSTEMI, dyslipidemia, Elevated calcium, HTN - IV Heparin, cardiac cath. No deficits on exam, painfree.     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 55% proximal LAD stenosis and 20% mid RCA stenosis noted, nonobstructive. Continue aggressive medical management, DAPT, Metoprolol, statin.  On exam pt is painfree, no complaints. Written for d/c home in stable condition.     ED Treatment-Medication Administration from 05/20/2025 0953 to 05/20/2025 1410         Date/Time Order Dose Route Action     05/20/2025 1139 aspirin chewable tablet 324 mg 324 mg Oral Given     05/20/2025 1208 atorvastatin (LIPITOR) tablet 80 mg 80 mg Oral Given     05/20/2025 1208 metoprolol tartrate (LOPRESSOR) tablet 25 mg 25 mg Oral Given     05/20/2025 1159 iohexol (OMNIPAQUE) 350 MG/ML injection (SINGLE-DOSE) 100 mL 100 mL Intravenous Given     05/20/2025 1244 nitroglycerin (NITROSTAT) SL tablet 0.4 mg 0.4 mg Sublingual Given     05/20/2025 1233 heparin (porcine) injection 4,000 Units 4,000 Units Intravenous Given     05/20/2025 1239 heparin (porcine) 25,000 units in 0.45% NaCl 250 mL infusion (premix) 11.1 Units/kg/hr Intravenous New Bag     05/20/2025 1344 ticagrelor (BRILINTA) tablet 180 mg 180 mg Oral Given     05/20/2025 1340 perflutren  lipid microsphere (DEFINITY) injection 0.6 mL/min Intravenous Given            Scheduled Medications:  aspirin, 81 mg, Oral, Daily  metoprolol succinate, 25 mg, Oral, Daily  ticagrelor, 90 mg, Oral, Q12H CASANDRA      Continuous IV Infusions:     PRN Meds:  nitroglycerin, 0.4 mg, Sublingual, Q5 Min PRN      ED Triage Vitals   Temperature Pulse Respirations Blood Pressure SpO2 Pain Score   05/20/25 1015 05/20/25 1002 05/20/25 1002 05/20/25 1002 05/20/25 1002 05/20/25 1419   98.4 °F (36.9 °C) 77 18 (!) 190/117 98 % No Pain     Weight (last 2 days)       Date/Time Weight    05/21/25 0543 96 (211.64)    05/20/25 1332 97.5 (215)    05/20/25 1145 97.6 (215.2)            Vital Signs (last 3 days)       Date/Time Temp Pulse Resp BP MAP (mmHg) SpO2 Calculated FIO2 (%) - Nasal Cannula Nasal Cannula O2 Flow Rate (L/min) O2 Device Patient Position - Orthostatic VS [place] Coma Scale Score Pain    05/21/25 1100 98.7 °F (37.1 °C) 72 18 153/104 114 98 % -- -- None (Room air) Lying -- --    05/21/25 0800 -- -- -- -- -- -- -- -- -- -- 15 No Pain    05/21/25 0743 97.1 °F (36.2 °C) 83 18 140/88 109 100 % -- -- None (Room air) Lying -- --    05/21/25 0308 97.6 °F (36.4 °C) 76 18 146/86 111 96 % -- -- None (Room air) Lying -- --    05/21/25 0115 98 °F (36.7 °C) 72 18 155/68 98 98 % -- -- None (Room air) Lying -- --    05/20/25 2316 97.8 °F (36.6 °C) 70 18 158/77 110 98 % -- -- None (Room air) Lying -- --    05/20/25 2115 97.8 °F (36.6 °C) 73 18 152/90 112 100 % -- -- None (Room air) Lying -- --    05/20/25 2015 -- 70 18 151/94 117 100 % -- -- None (Room air) Lying -- --    05/20/25 1914 97.7 °F (36.5 °C) 73 18 133/88 105 99 % -- -- None (Room air) Lying -- --    05/20/25 1910 -- -- -- -- -- -- -- -- -- -- 15 No Pain    05/20/25 1815 -- 80 16 163/100 126 -- -- -- -- Lying -- --    05/20/25 1715 -- 77 16 158/102 126 -- -- -- -- Lying -- --    05/20/25 1700 -- -- -- -- -- -- -- -- -- -- 15 No Pain    05/20/25 1645 -- 83 16 155/95 83 -- -- -- --  Lying -- --    05/20/25 1615 -- 71 16 150/94 117 -- -- -- -- Lying -- --    05/20/25 1600 -- 65 16 129/91 106 -- -- -- -- Lying -- --    05/20/25 1545 -- 74 16 149/95 116 -- -- -- -- Lying -- --    05/20/25 1530 -- 66 16 116/78 93 98 % -- -- None (Room air) Lying -- --    05/20/25 14:19:28 -- -- -- -- -- -- -- -- -- -- -- No Pain    05/20/25 14:18:52 -- -- -- -- -- -- 408 97 L/min Nasal cannula -- -- --    05/20/25 1345 -- 74 16 143/99 117 98 % -- -- -- -- -- --    05/20/25 1332 -- 74 -- 143/99 -- -- -- -- -- -- -- --    05/20/25 1230 -- 68 14 166/105 131 100 % -- -- -- -- -- --    05/20/25 1208 -- 74 -- 190/108 -- -- -- -- -- -- -- --    05/20/25 1145 -- 72 18 178/110 138 97 % -- -- None (Room air) Lying -- --    05/20/25 1100 -- 74 18 156/100 123 97 % -- -- None (Room air) Lying -- --    05/20/25 1045 -- 71 18 162/99 126 97 % -- -- None (Room air) Lying -- --    05/20/25 1030 -- 69 17 158/99 121 97 % -- -- None (Room air) Lying -- --    05/20/25 1015 98.4 °F (36.9 °C) -- -- -- -- -- -- -- -- -- -- --    05/20/25 1014 -- -- -- -- -- -- -- -- None (Room air) -- -- --    05/20/25 1013 -- -- -- -- -- -- -- -- -- -- 15 --    05/20/25 1002 -- 77 18 190/117 -- 98 % -- -- None (Room air) Sitting -- --              Pertinent Labs/Diagnostic Test Results:   Radiology:  CTA dissection protocol chest/abdomen/pelvis   ED Interpretation by Arelis Smith DO (05/20 1233)   IMPRESSION:     1.  No aortic dissection or intramural hematoma.  2.  No acute process in the chest, abdomen, or pelvis.  3.  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  4.  Scattered coronary artery disease.        Final Interpretation by Nate Brown MD (05/20 0125)      1.  No aortic dissection or intramural hematoma.   2.  No acute process in the chest, abdomen, or pelvis.   3.  Moderate stenosis at the origin of the celiac axis with poststenotic  dilatation, correlate clinically for median arcuate ligament syndrome.   4.  Scattered coronary artery disease.               Workstation performed: [id number]         XR chest 2 views   Final Interpretation by Caroline Malik MD (05/20 1973)      No acute cardiopulmonary disease.            Workstation performed: [id number]           Cardiology:  Cardiac catheterization   Final Result by Martha Marlow DO (05/20 5503)        S/P PCI with DIANNE x 1 to the Distal LCX 80% stenosis and culprit for his    NSTEMI     LVEDP is normal without gradient on LV-AO pullback         Echo complete w/ contrast if indicated   Final Result by Emiliana Trevino DO (05/20 0875)        Left Ventricle: Left ventricular cavity size is normal. Wall thickness    is mildly increased. The left ventricular ejection fraction is 50% by    visual estimation. Systolic function is low normal. Global longitudinal    strain is reduced at -12%. Diastolic function is mildly abnormal,    consistent with grade I (abnormal) relaxation.     Right Ventricle: Right ventricular cavity size is mildly dilated.     Mitral Valve: There is mild annular calcification. There is mild    regurgitation with an eccentrically directed jet.     Tricuspid Valve: There is mild regurgitation. The tricuspid valve    regurgitation jet is eccentric.     The following segments are hypokinetic: basal inferolateral, mid    inferolateral, mid anterolateral, apical anterior, apical inferior, apical    lateral and apex.     Other segments could not be evaluated.         ECG 12 lead   Final Result by Emiliana Trevino DO (05/20 7611)   Normal sinus rhythm   Normal ECG   When compared with ECG of 20-May-2025 12:03, (unconfirmed)   No significant change was found   Confirmed by Emiliana Trevino ([id number]) on 5/20/2025 1:09:31 PM      ECG 12 lead   Final Result by Emiliana Trevino DO (05/20 8701)   Normal sinus rhythm   When compared with ECG of 20-May-2025 10:07,   No significant change was found    Confirmed by Emiliana Trevino ([id number]) on 5/20/2025 1:09:28 PM      ECG 12 lead   Final Result by Gerry Rivas DO (05/21 0727)   Normal sinus rhythm   Normal ECG   No previous ECGs available   Confirmed by Gerry Rivas ([id number]) on 5/21/2025 7:27:09 AM        GI:  No orders to display           Results from last 7 days   Lab Units 05/21/25  0506 05/20/25  1045   WBC Thousand/uL 8.57 3.81*   HEMOGLOBIN g/dL 14.2 15.1   HEMATOCRIT % 42.8 45.6   PLATELETS Thousands/uL 208 230         Results from last 7 days   Lab Units 05/21/25  0506 05/20/25  1045   SODIUM mmol/L 139 140   POTASSIUM mmol/L 4.1 3.9   CHLORIDE mmol/L 107 104   CO2 mmol/L 25 29   ANION GAP mmol/L 7 7   BUN mg/dL 12 15   CREATININE mg/dL 0.90 1.04   EGFR ml/min/1.73sq m 93 78   CALCIUM mg/dL 8.9 9.6     Results from last 7 days   Lab Units 05/20/25  1045   AST U/L 39   ALT U/L 23   ALK PHOS U/L 56   TOTAL PROTEIN g/dL 6.7   ALBUMIN g/dL 4.4   TOTAL BILIRUBIN mg/dL 0.58         Results from last 7 days   Lab Units 05/21/25  0506 05/20/25  1045   GLUCOSE RANDOM mg/dL 105 111       Results from last 7 days   Lab Units 05/20/25  1242 05/20/25  1045   HS TNI 0HR ng/L  --  2,321*   HS TNI 2HR ng/L 3,268*  --    HSTNI D2 ng/L 947*  --          Results from last 7 days   Lab Units 05/20/25  1143   PROTIME seconds 13.6   INR  1.02   PTT seconds 27       Results from last 7 days   Lab Units 05/20/25  1045   LIPASE u/L 26         Past Medical History:   Diagnosis Date    COVID-19     No pertinent past medical history        Admitting Diagnosis: Chest pain [R07.9]  Elevated troponin [R79.89]  NSTEMI (non-ST elevated myocardial infarction) (HCC) [I21.4]  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 male    Network Utilization Review Department  ATTENTION: Please call with any questions or concerns to [phone] and carefully listen to the prompts so that you are directed to the right person. All voicemails are confidential.   For Discharge needs,  contact Care Management DC Support Team at [phone] opt. 2  Send all requests for admission clinical reviews, approved or denied determinations and any other requests to dedicated fax number below belonging to the campus where the patient is receiving treatment. List of dedicated fax numbers for the Facilities:  FACILITY NAME UR FAX NUMBER   ADMISSION DENIALS (Administrative/Medical Necessity) [phone]   DISCHARGE SUPPORT TEAM (NETWORK) [phone]   PARENT CHILD HEALTH (Maternity/NICU/Pediatrics) [phone]   [facility] [phone]   [facility] [phone]   [facility] [phone]   [facility] [phone]   [facility] [phone]   [facility] [phone]   [facility] [phone]   [facility] [phone]   [facility] [phone]   [facility] [phone]   [facility] [phone]   [facility] [phone]

## 2025-05-21 NOTE — PROGRESS NOTES
Progress Note - Cardiology   Name: Odilon Hess 59 y.o. male I MRN: [id number]  Unit/Bed#: E4 -01 I Date of Admission: 5/20/2025   Date of Service: 5/21/2025 I Hospital Day: 1    Assessment & Plan  NSTEMI (non-ST elevated myocardial infarction) (HCC)  Secondary to 80% distal left circumflex stenosis, now status post PCI/DIANNE x 1.  55% proximal LAD stenosis and 20% mid RCA stenosis noted, nonobstructive.  Continue aggressive medical management.  Continue dual antiplatelet therapy with aspirin and Brilinta.  Check on cost for Brilinta prior to discharge  Continue metoprolol  Continue high-dose rosuvastatin 40 mg daily at discharge  Hyperlipidemia  Continue rosuvastatin 40 mg daily at discharge    Subjective   Chief Complaint: Patient seen and examined, denies chest pain, shortness of breath      Objective :  Temp:  [97.1 °F (36.2 °C)-98.7 °F (37.1 °C)] 98.7 °F (37.1 °C)  HR:  [65-83] 72  BP: (116-190)/() 153/104  Resp:  [14-18] 18  SpO2:  [96 %-100 %] 98 %  O2 Device: None (Room air)  Nasal Cannula O2 Flow Rate (L/min):  [97 L/min] 97 L/min  Orthostatic Blood Pressures      Flowsheet Row Most Recent Value   Blood Pressure 153/104 filed at 05/21/2025 1100   Patient Position - Orthostatic VS Lying filed at 05/21/2025 1100          First Weight: Weight - Scale: 97.6 kg (215 lb 3.2 oz) (05/20/25 1145)  Vitals:    05/20/25 1332 05/21/25 0543   Weight: 97.5 kg (215 lb) 96 kg (211 lb 10.3 oz)     Physical Exam  Constitutional:       General: He is not in acute distress.     Appearance: He is well-developed. He is not diaphoretic.   HENT:      Head: Normocephalic and atraumatic.     Eyes:      General: No scleral icterus.        Right eye: No discharge.      Pupils: Pupils are equal, round, and reactive to light.     Neck:      Thyroid: No thyromegaly.     Cardiovascular:      Rate and Rhythm: Normal rate and regular rhythm.      Heart sounds: Normal heart sounds. No murmur heard.     No friction rub. No gallop.  "  Pulmonary:      Effort: Pulmonary effort is normal.      Breath sounds: Normal breath sounds.   Abdominal:      General: There is no distension.      Tenderness: There is no abdominal tenderness. There is no guarding or rebound.     Musculoskeletal:         General: Normal range of motion.      Cervical back: Normal range of motion and neck supple.     Skin:     General: Skin is warm and dry.      Coloration: Skin is not pale.      Findings: No erythema or rash.     Neurological:      Mental Status: He is alert and oriented to person, place, and time.      Coordination: Coordination normal.     Psychiatric:         Behavior: Behavior normal.         Thought Content: Thought content normal.         Judgment: Judgment normal.           Lab Results: I have reviewed the following results:CBC/BMP:   .     05/21/25  0506   WBC 8.57   HGB 14.2   HCT 42.8      SODIUM 139   K 4.1      CO2 25   BUN 12   CREATININE 0.90   GLUC 105    , Creatinine Clearance: Estimated Creatinine Clearance: 104 mL/min (by C-G formula based on SCr of 0.9 mg/dL).  Results from last 7 days   Lab Units 05/21/25  0506 05/20/25  1045   WBC Thousand/uL 8.57 3.81*   HEMOGLOBIN g/dL 14.2 15.1   HEMATOCRIT % 42.8 45.6   PLATELETS Thousands/uL 208 230     Results from last 7 days   Lab Units 05/21/25  0506 05/20/25  1045   POTASSIUM mmol/L 4.1 3.9   CHLORIDE mmol/L 107 104   CO2 mmol/L 25 29   BUN mg/dL 12 15   CREATININE mg/dL 0.90 1.04   CALCIUM mg/dL 8.9 9.6     Results from last 7 days   Lab Units 05/20/25  1143   INR  1.02   PTT seconds 27     Lab Results   Component Value Date    HGBA1C 5.6 02/05/2022     No results found for: \"CKTOTAL\", \"CKMB\", \"CKMBINDEX\", \"TROPONINI\"  "

## 2025-05-21 NOTE — CASE MANAGEMENT
Case Management Assessment & Discharge Planning Note    Patient name Odilon Hess  Location [facility] /E4 -* MRN [id number]  : 1965 Date 2025       Current Admission Date: 2025  Current Admission Diagnosis:NSTEMI (non-ST elevated myocardial infarction) (HCC)   Patient Active Problem List    Diagnosis Date Noted    NSTEMI (non-ST elevated myocardial infarction) (HCC) 2025    Hyperlipidemia 2025    Abnormal CT scan 2025    Coronary artery disease involving native coronary artery 2025      LOS (days): 1  Geometric Mean LOS (GMLOS) (days): 2  Days to GMLOS:1     OBJECTIVE:    Risk of Unplanned Readmission Score: 8.16         Current admission status: Inpatient       Preferred Pharmacy:   [id number]  [place] - [address]  [address] [address]  Phone: [phone] Fax: [phone]    [id number]  [place] - [address] [place] [address]  [address] [place] [address]  Phone: [phone] Fax: [phone]    [facility] [place] [place] - [address]  [facility]  [address]  [facility]  [facility] [place] [address]  Phone: [phone] Fax: [phone]    Primary Care Provider: Abdifatah Schmidt MD    [facility]  Secondary Insurance:     ASSESSMENT:  Active Health Care Proxies    There are no active Health Care Proxies on file.                 Readmission Root Cause  30 Day Readmission: No    Patient Information  Admitted from:: Home  Mental Status: Alert  During Assessment patient was accompanied by: Spouse  Assessment information provided by:: Patient, Spouse  Primary Caregiver: Self  Support Systems: Spouse/significant other  County of Residence: [place]  What city do you live in?: [place]  Type of Current Residence: Other (Comment) (private residence)  Living Arrangements: Lives w/ Spouse/significant other  Is patient a ?: No    Activities of Daily Living Prior to  Admission  Functional Status: Independent  Ambulates independently?: Yes  Does patient use assisted devices?: No  Does patient currently own DME?: No  Does patient have a history of Outpatient Therapy (PT/OT)?: No  Does the patient have a history of Short-Term Rehab?: No  Does patient have a history of HHC?: No  Does patient currently have HHC?: No         Patient Information Continued  Income Source: Employed  Does patient have prescription coverage?: Yes  Can the patient afford their medications and any related supplies (such as glucometers or test strips)?: No (Can not afford high cost meds.)  Does patient receive dialysis treatments?: No  Does patient have a history of substance abuse?: No (States he uses Alcohol socially.  See H and P)  Does patient have a history of Mental Health Diagnosis?: No         Means of Transportation  Means of Transport to \[facility]\"":: Drives Self          DISCHARGE DETAILS:    Discharge planning discussed with:: pt and wife  Freedom of Choice: Yes  Comments - Freedom of Choice: discussed cost of anticoagulation  CM contacted family/caregiver?: Yes  Were Treatment Team discharge recommendations reviewed with patient/caregiver?: Yes     Were patient/caregiver advised of the risks associated with not following Treatment Team discharge recommendations?: Yes    Contacts  Patient Contacts: OnKaren hearn (Spouse)  [phone] (Mobile)  Relationship to Patient:: Family  Contact Method: In Person  Reason/Outcome: Continuity of Care, Discharge Planning         DME Referral Provided  Referral made for DME?: No         Would you like to participate in our [facility] service program?  : No - Declined       Discharge Destination Plan:: Home  Transport at Discharge : Family                                      Additional Comments: CM met with the pt and wife.  They were made aware of the CM role and assessment was done.  Pt  resides with his wife.  He has commercial insurance plan and needs to be  on anticoagulamt.  Brillinta was initially recommended and price check done- $155.42/mo.  Pt was made aware and he can not afford that drug.  Provider aware and changed him to Plavix.  He stated he was told it would cost only $4/mo.  Plan is to go home on the Plavix, and pt plans to discharge home today.  Wife to transport.

## 2025-05-21 NOTE — PLAN OF CARE
Problem: PAIN - ADULT  Goal: Verbalizes/displays adequate comfort level or baseline comfort level  Description: Interventions:  - Encourage patient to monitor pain and request assistance  - Assess pain using appropriate pain scale  - Administer analgesics as ordered based on type and severity of pain and evaluate response  - Implement non-pharmacological measures as appropriate and evaluate response  - Consider cultural and social influences on pain and pain management  - Notify physician/advanced practitioner if interventions unsuccessful or patient reports new pain  - Educate patient/family on pain management process including their role and importance of  reporting pain   - Provide non-pharmacologic/complimentary pain relief interventions  Outcome: Progressing     Problem: INFECTION - ADULT  Goal: Absence or prevention of progression during hospitalization  Description: INTERVENTIONS:  - Assess and monitor for signs and symptoms of infection  - Monitor lab/diagnostic results  - Monitor all insertion sites, i.e. indwelling lines, tubes, and drains  - Monitor endotracheal if appropriate and nasal secretions for changes in amount and color  - [place] appropriate cooling/warming therapies per order  - Administer medications as ordered  - Instruct and encourage patient and family to use good hand hygiene technique  - Identify and instruct in appropriate isolation precautions for identified infection/condition  Outcome: Progressing  Goal: Absence of fever/infection during neutropenic period  Description: INTERVENTIONS:  - Monitor WBC  - Perform strict hand hygiene  - Limit to healthy visitors only  - No plants, dried, fresh or silk flowers with sotelo in patient room  Outcome: Progressing     Problem: SAFETY ADULT  Goal: Patient will remain free of falls  Description: INTERVENTIONS:  - Educate patient/family on patient safety including physical limitations  - Instruct patient to call for assistance with activity   -  Consider consulting OT/PT to assist with strengthening/mobility based on AM PAC & JH-HLM score  - Consult OT/PT to assist with strengthening/mobility   - Keep Call bell within reach  - Keep bed low and locked with side rails adjusted as appropriate  - Keep care items and personal belongings within reach  - Initiate and maintain comfort rounds  - Make Fall Risk Sign visible to staff  - Offer Toileting every 3 Hours, in advance of need  - Initiate/Maintain alarm  - Obtain necessary fall risk management equipment:   - Apply yellow socks and bracelet for high fall risk patients  - Consider moving patient to room near nurses station  Outcome: Progressing  Goal: Maintain or return to baseline ADL function  Description: INTERVENTIONS:  -  Assess patient's ability to carry out ADLs; assess patient's baseline for ADL function and identify physical deficits which impact ability to perform ADLs (bathing, care of mouth/teeth, toileting, grooming, dressing, etc.)  - Assess/evaluate cause of self-care deficits   - Assess range of motion  - Assess patient's mobility; develop plan if impaired  - Assess patient's need for assistive devices and provide as appropriate  - Encourage maximum independence but intervene and supervise when necessary  - Involve family in performance of ADLs  - Assess for home care needs following discharge   - Consider OT consult to assist with ADL evaluation and planning for discharge  - Provide patient education as appropriate  - Monitor functional capacity and physical performance, use of AM PAC & JH-HLM   - Monitor gait, balance and fatigue with ambulation    Outcome: Progressing  Goal: Maintains/Returns to pre admission functional level  Description: INTERVENTIONS:  - Perform AM-PAC 6 Click Basic Mobility/ Daily Activity assessment daily.  - Set and communicate daily mobility goal to care team and patient/family/caregiver.   - Collaborate with rehabilitation services on mobility goals if consulted  -  Perform Range of Motion 3 times a day.  - Reposition patient every 3 hours.  - Dangle patient 3 times a day  - Stand patient 3 times a day  - Ambulate patient 3 times a day  - Out of bed to chair 3 times a day   - Out of bed for meals 3 times a day  - Out of bed for toileting  - Record patient progress and toleration of activity level   Outcome: Progressing     Problem: DISCHARGE PLANNING  Goal: Discharge to home or other facility with appropriate resources  Description: INTERVENTIONS:  - Identify barriers to discharge w/patient and caregiver  - Arrange for needed discharge resources and transportation as appropriate  - Identify discharge learning needs (meds, wound care, etc.)  - Arrange for interpretive services to assist at discharge as needed  - Refer to Case Management Department for coordinating discharge planning if the patient needs post-hospital services based on physician/advanced practitioner order or complex needs related to functional status, cognitive ability, or social support system  Outcome: Progressing     Problem: Knowledge Deficit  Goal: Patient/family/caregiver demonstrates understanding of disease process, treatment plan, medications, and discharge instructions  Description: Complete learning assessment and assess knowledge base.  Interventions:  - Provide teaching at level of understanding  - Provide teaching via preferred learning methods  Outcome: Progressing

## 2025-05-21 NOTE — ASSESSMENT & PLAN NOTE
59 year old male with a history of hyperlipidemia presented to our institution due to chest discomfort of one day duration. Described it as substernal in location, non radiating. No clear association with exertion, nor at rest. Troponin levels currently at 2321. Admission was requested for NSTEMI management.  Echocardiogram completed with EF 50%, grade 1 diastolic dysfunction  Seen in consult by cardiology  Underwent cardiac catheterization yesterday and received PCI with DIANNE to distal left circumflex  Started on aspirin and Brilinta, Brilinta unaffordable will switch to Plavix-will load with 600 mg this evening then start 75 mg daily thereafter  Continue Toprol XL  Monitored on telemetry during hospital stay  Outpatient follow-up with cardiology    Results from last 7 days   Lab Units 05/20/25  1242 05/20/25  1045   HS TNI 0HR ng/L  --  2,321*   HS TNI 2HR ng/L 3,268*  --

## 2025-05-21 NOTE — DISCHARGE SUMMARY
Discharge Summary - Hospitalist   Name: Odilon Hess 59 y.o. male I MRN: [id number]  Unit/Bed#: E4 -01 I Date of Admission: 5/20/2025   Date of Service: 5/21/2025 I Hospital Day: 1     Assessment & Plan  NSTEMI (non-ST elevated myocardial infarction) (HCC)  59 year old male with a history of hyperlipidemia presented to our institution due to chest discomfort of one day duration. Described it as substernal in location, non radiating. No clear association with exertion, nor at rest. Troponin levels currently at 2321. Admission was requested for NSTEMI management.  Echocardiogram completed with EF 50%, grade 1 diastolic dysfunction  Seen in consult by cardiology  Underwent cardiac catheterization yesterday and received PCI with DIANNE to distal left circumflex  Started on aspirin and Brilinta, Brilinta unaffordable will switch to Plavix-will load with 600 mg this evening then start 75 mg daily thereafter  Continue Toprol XL  Monitored on telemetry during hospital stay  Outpatient follow-up with cardiology    Results from last 7 days   Lab Units 05/20/25  1242 05/20/25  1045   HS TNI 0HR ng/L  --  2,321*   HS TNI 2HR ng/L 3,268*  --      Hyperlipidemia  Continue statin  Abnormal CT scan  CT scan: 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   Vascular surgery consulted. From my discussion with their team so far, no surgical intervention indicated at this time.  Coronary artery disease involving native coronary artery  See plan for NSTEMI  Continue DAPT and outpatient cardiology follow-up     Medical Problems       Resolved Problems  Date Reviewed: 6/14/2024   None       Discharging Physician / Practitioner: Teri Olivarez PA-C  PCP: Abdifatah Schmidt MD  Admission Date:   Admission Orders (From admission, onward)       Ordered        05/20/25 1305  INPATIENT ADMISSION  Once                          Discharge Date:  05/21/25    Next Steps for Physician/AP Assuming Care:  Outpatient follow-up with cardiology  Continue DAPT x 1 year    Test Results Pending at Discharge (will require follow up):  None    Medication Changes for Discharge & Rationale:   Started on aspirin and Plavix to be continued for 1 year  Switched from pravastatin to atorvastatin  Started Toprol XL  See after visit summary for reconciled discharge medications provided to patient and/or family.     Consultations During Hospital Stay:  Cardiology    Procedures Performed:   XR chest 2 views  Result Date: 5/20/2025  Impression: No acute cardiopulmonary disease.     CTA dissection protocol chest/abdomen/pelvis  Result Date: 5/20/2025  Impression: 1.  No aortic dissection or intramural hematoma. 2.  No acute process in the chest, abdomen, or pelvis. 3.  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 4.  Scattered coronary artery disease.    Echocardiogram showing EF 50% with grade 1 diastolic dysfunction    Cardiac catheterization-PCI with DIANNE to distal left circumflex 80% stenosis    Significant Findings / Test Results:   See above    Incidental Findings:   Moderate stenosis at the origin of celiac axis      Hospital Course:   Odilon Hess is a 59 y.o. male patient who originally presented to the hospital on 5/20/2025 due to chest pain.  Patient is presented hospital complaint of chest pain.  Was found to have elevated troponin and was seen in consult with cardiology.  He underwent echocardiogram and cardiac catheterization.  He received 1 stent to the circumflex.  He was started on aspirin and Brilinta, switched to Plavix due to price.  He was also started on Toprol-XL as well as switched from pravastatin to Lipitor.  He was monitored on telemetry for 24 hours after procedure and was cleared for discharge with outpatient cardiology follow-up.        Please see above list  "of diagnoses and related plan for additional information.     Discharge Day Visit / Exam:   Subjective: Patient seen and examined at bedside.  Notes he is feeling very well today.  Denies any recurrence in his pain.  Feels comfortable turning home.  Vitals: Blood Pressure: (!) 153/104 (05/21/25 1100)  Pulse: 72 (05/21/25 1100)  Temperature: 98.7 °F (37.1 °C) (05/21/25 1100)  Temp Source: Temporal (05/21/25 1100)  Respirations: 18 (05/21/25 1100)  Height: 6' 2\" (188 cm) (05/20/25 1332)  Weight - Scale: 96 kg (211 lb 10.3 oz) (05/21/25 0543)  SpO2: 98 % (05/21/25 1100)  Physical Exam  Vitals reviewed.   Constitutional:       General: He is not in acute distress.  HENT:      Head: Normocephalic and atraumatic.     Eyes:      General: No scleral icterus.     Conjunctiva/sclera: Conjunctivae normal.       Cardiovascular:      Rate and Rhythm: Normal rate and regular rhythm.      Heart sounds: No murmur heard.  Pulmonary:      Effort: Pulmonary effort is normal. No respiratory distress.      Breath sounds: Normal breath sounds.   Abdominal:      General: Bowel sounds are normal. There is no distension.      Palpations: Abdomen is soft.      Tenderness: There is no abdominal tenderness.     Musculoskeletal:      Cervical back: Neck supple.      Right lower leg: No edema.      Left lower leg: No edema.     Skin:     General: Skin is warm and dry.     Neurological:      Mental Status: He is alert and oriented to person, place, and time.     Psychiatric:         Mood and Affect: Mood normal.         Behavior: Behavior normal.          Discussion with Family: Updated  (significant other) at bedside.    Discharge instructions/Information to patient and family:   See after visit summary for information provided to patient and family.      Provisions for Follow-Up Care:  See after visit summary for information related to follow-up care and any pertinent home health orders.      Mobility at time of Discharge:   Basic " Mobility Inpatient Raw Score: 24  JH-HLM Goal: 8: Walk 250 feet or more  JH-HLM Achieved: 7: Walk 25 feet or more  HLM Goal NOT achieved. Continue to encourage mobility in post discharge setting.     Disposition:   Home    Planned Readmission: None    Administrative Statements   Discharge Statement:  I have spent a total time of 35 minutes in caring for this patient on the day of the visit/encounter. .    **Please Note: This note may have been constructed using a voice recognition system**

## 2025-05-21 NOTE — TELEPHONE ENCOUNTER
Patient called to notify PCP that he was admitted to hospital yesterday after going to ED for chest pain.  Please notify PCP.

## 2025-05-21 NOTE — PLAN OF CARE
Problem: PAIN - ADULT  Goal: Verbalizes/displays adequate comfort level or baseline comfort level  Description: Interventions:  - Encourage patient to monitor pain and request assistance  - Assess pain using appropriate pain scale  - Administer analgesics as ordered based on type and severity of pain and evaluate response  - Implement non-pharmacological measures as appropriate and evaluate response  - Consider cultural and social influences on pain and pain management  - Notify physician/advanced practitioner if interventions unsuccessful or patient reports new pain  - Educate patient/family on pain management process including their role and importance of  reporting pain   - Provide non-pharmacologic/complimentary pain relief interventions  5/21/2025 1233 by Magno Alberts RN  Outcome: Adequate for Discharge  5/21/2025 0946 by Magno Alberts RN  Outcome: Progressing     Problem: INFECTION - ADULT  Goal: Absence or prevention of progression during hospitalization  Description: INTERVENTIONS:  - Assess and monitor for signs and symptoms of infection  - Monitor lab/diagnostic results  - Monitor all insertion sites, i.e. indwelling lines, tubes, and drains  - Monitor endotracheal if appropriate and nasal secretions for changes in amount and color  - [place] appropriate cooling/warming therapies per order  - Administer medications as ordered  - Instruct and encourage patient and family to use good hand hygiene technique  - Identify and instruct in appropriate isolation precautions for identified infection/condition  5/21/2025 1233 by Magno Alberts RN  Outcome: Adequate for Discharge  5/21/2025 0946 by Magno Alberts RN  Outcome: Progressing  Goal: Absence of fever/infection during neutropenic period  Description: INTERVENTIONS:  - Monitor WBC  - Perform strict hand hygiene  - Limit to healthy visitors only  - No plants, dried, fresh or silk flowers with sotelo in patient room  5/21/2025 1233 by Magno Alberts  RN  Outcome: Adequate for Discharge  5/21/2025 0946 by Magno Alberts RN  Outcome: Progressing     Problem: SAFETY ADULT  Goal: Patient will remain free of falls  Description: INTERVENTIONS:  - Educate patient/family on patient safety including physical limitations  - Instruct patient to call for assistance with activity   - Consider consulting OT/PT to assist with strengthening/mobility based on AM PAC & JH-HLM score  - Consult OT/PT to assist with strengthening/mobility   - Keep Call bell within reach  - Keep bed low and locked with side rails adjusted as appropriate  - Keep care items and personal belongings within reach  - Initiate and maintain comfort rounds  - Make Fall Risk Sign visible to staff  - Offer Toileting every 3 Hours, in advance of need  - Initiate/Maintain alarm  - Obtain necessary fall risk management equipment:   - Apply yellow socks and bracelet for high fall risk patients  - Consider moving patient to room near nurses station  5/21/2025 1233 by Magno Alberts RN  Outcome: Adequate for Discharge  5/21/2025 0946 by Magno Alberts RN  Outcome: Progressing  Goal: Maintain or return to baseline ADL function  Description: INTERVENTIONS:  -  Assess patient's ability to carry out ADLs; assess patient's baseline for ADL function and identify physical deficits which impact ability to perform ADLs (bathing, care of mouth/teeth, toileting, grooming, dressing, etc.)  - Assess/evaluate cause of self-care deficits   - Assess range of motion  - Assess patient's mobility; develop plan if impaired  - Assess patient's need for assistive devices and provide as appropriate  - Encourage maximum independence but intervene and supervise when necessary  - Involve family in performance of ADLs  - Assess for home care needs following discharge   - Consider OT consult to assist with ADL evaluation and planning for discharge  - Provide patient education as appropriate  - Monitor functional capacity and physical  performance, use of AM PAC & -HLM   - Monitor gait, balance and fatigue with ambulation    5/21/2025 1233 by Magno Alberts RN  Outcome: Adequate for Discharge  5/21/2025 0946 by Magno Alberts RN  Outcome: Progressing  Goal: Maintains/Returns to pre admission functional level  Description: INTERVENTIONS:  - Perform AM-PAC 6 Click Basic Mobility/ Daily Activity assessment daily.  - Set and communicate daily mobility goal to care team and patient/family/caregiver.   - Collaborate with rehabilitation services on mobility goals if consulted  - Perform Range of Motion 3 times a day.  - Reposition patient every 3 hours.  - Dangle patient 3 times a day  - Stand patient 3 times a day  - Ambulate patient 3 times a day  - Out of bed to chair 3 times a day   - Out of bed for meals 3 times a day  - Out of bed for toileting  - Record patient progress and toleration of activity level   5/21/2025 1233 by Magno Alberts RN  Outcome: Adequate for Discharge  5/21/2025 0946 by Magno Alberts RN  Outcome: Progressing     Problem: DISCHARGE PLANNING  Goal: Discharge to home or other facility with appropriate resources  Description: INTERVENTIONS:  - Identify barriers to discharge w/patient and caregiver  - Arrange for needed discharge resources and transportation as appropriate  - Identify discharge learning needs (meds, wound care, etc.)  - Arrange for interpretive services to assist at discharge as needed  - Refer to Case Management Department for coordinating discharge planning if the patient needs post-hospital services based on physician/advanced practitioner order or complex needs related to functional status, cognitive ability, or social support system  5/21/2025 1233 by Magno Alberts RN  Outcome: Adequate for Discharge  5/21/2025 0946 by Magno Alberts RN  Outcome: Progressing     Problem: Knowledge Deficit  Goal: Patient/family/caregiver demonstrates understanding of disease process, treatment plan, medications, and  discharge instructions  Description: Complete learning assessment and assess knowledge base.  Interventions:  - Provide teaching at level of understanding  - Provide teaching via preferred learning methods  5/21/2025 1233 by Magno Alberts RN  Outcome: Adequate for Discharge  5/21/2025 0946 by Magno Alberts RN  Outcome: Progressing

## 2025-05-21 NOTE — UTILIZATION REVIEW
NOTIFICATION OF INPATIENT ADMISSION   AUTHORIZATION REQUEST   SERVICING FACILITY:   [place] [address]  Tax ID: [id number]  NPI: [id number] ATTENDING PROVIDER:  Attending Name and NPI#: Bill Cali Md [[id number]]  Address: [address] [place] [address]  Phone: [phone]     ADMISSION INFORMATION:  Place of Service: Inpatient [facility]  Place of Service Code: 21  Inpatient Admission Date/Time: 5/20/25  1:05 PM  Discharge Date/Time: 5/21/2025 12:45 PM  Admitting Diagnosis Code/Description:  Chest pain [R07.9]  Elevated troponin [R79.89]  NSTEMI (non-ST elevated myocardial infarction) (HCC) [I21.4]  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     PHYSICIAN ADVISORY SERVICES:  Medical Necessity Denial & [id number]-xx-Kyyf Review  Phone: [phone]  Fax: [phone]  Email: [email or web address]     DISCHARGE SUPPORT TEAM:  For Patients Discharge Needs & Updates  Phone: [phone] opt. 2 Fax: [phone]  Email: [email or web address]

## 2025-05-21 NOTE — ASSESSMENT & PLAN NOTE
Secondary to 80% distal left circumflex stenosis, now status post PCI/DIANNE x 1.  55% proximal LAD stenosis and 20% mid RCA stenosis noted, nonobstructive.  Continue aggressive medical management.  Continue dual antiplatelet therapy with aspirin and Brilinta.  Check on cost for Brilinta prior to discharge  Continue metoprolol  Continue high-dose rosuvastatin 40 mg daily at discharge

## 2025-05-22 ENCOUNTER — TELEPHONE (OUTPATIENT)
Dept: CARDIOLOGY CLINIC | Facility: CLINIC | Age: 60
End: 2025-05-22

## 2025-05-22 NOTE — UTILIZATION REVIEW
NOTIFICATION OF ADMISSION DISCHARGE   This is a Notification of Discharge from [facility]. Please be advised that this patient has been discharge from our facility. Below you will find the admission and discharge date and time including the patient’s disposition.   UTILIZATION REVIEW CONTACT:  Utilization Review Assistants  Network Utilization Review Department  Phone: [phone] x carefully listen to the prompts. All voicemails are confidential.  Email: [email or web address]     ADMISSION INFORMATION  PRESENTATION DATE: 5/20/2025  9:57 AM  OBERVATION ADMISSION DATE: N/A  INPATIENT ADMISSION DATE: 5/20/25  1:05 PM   DISCHARGE DATE: 5/21/2025 12:45 PM   DISPOSITION:Home/Self Care    Network Utilization Review Department  ATTENTION: Please call with any questions or concerns to [phone] and carefully listen to the prompts so that you are directed to the right person. All voicemails are confidential.   For Discharge needs, contact Care Management DC Support Team at [phone] opt. 2  Send all requests for admission clinical reviews, approved or denied determinations and any other requests to dedicated fax number below belonging to the campus where the patient is receiving treatment. List of dedicated fax numbers for the Facilities:  FACILITY NAME UR FAX NUMBER   ADMISSION DENIALS (Administrative/Medical Necessity) [phone]   DISCHARGE SUPPORT TEAM ([facility]) [phone]   PARENT CHILD HEALTH (Maternity/NICU/Pediatrics) [phone]   [facility] [phone]   [facility] [phone]   [facility] [phone]   [facility] [phone]   [facility] [phone]   [facility] [phone]   [facility] [phone]   [facility] [phone]   [facility]  [facility] [phone]   [facility] [phone]   [facility] [phone]   [facility] [phone]

## 2025-05-23 ENCOUNTER — TELEPHONE (OUTPATIENT)
Age: 60
End: 2025-05-23

## 2025-05-23 ENCOUNTER — OFFICE VISIT (OUTPATIENT)
Dept: INTERNAL MEDICINE CLINIC | Facility: CLINIC | Age: 60
End: 2025-05-23
Payer: COMMERCIAL

## 2025-05-23 VITALS
WEIGHT: 213 LBS | DIASTOLIC BLOOD PRESSURE: 80 MMHG | HEIGHT: 74 IN | SYSTOLIC BLOOD PRESSURE: 132 MMHG | HEART RATE: 74 BPM | OXYGEN SATURATION: 99 % | TEMPERATURE: 98.5 F | BODY MASS INDEX: 27.34 KG/M2

## 2025-05-23 DIAGNOSIS — K40.90 LEFT INGUINAL HERNIA: ICD-10-CM

## 2025-05-23 DIAGNOSIS — E78.5 HYPERLIPIDEMIA, UNSPECIFIED HYPERLIPIDEMIA TYPE: ICD-10-CM

## 2025-05-23 DIAGNOSIS — I21.4 NSTEMI (NON-ST ELEVATED MYOCARDIAL INFARCTION) (HCC): Primary | ICD-10-CM

## 2025-05-23 DIAGNOSIS — I25.10 CORONARY ARTERY DISEASE INVOLVING NATIVE CORONARY ARTERY OF NATIVE HEART WITHOUT ANGINA PECTORIS: ICD-10-CM

## 2025-05-23 DIAGNOSIS — I77.4 CELIAC ARTERY STENOSIS (HCC): ICD-10-CM

## 2025-05-23 PROCEDURE — 99496 TRANSJ CARE MGMT HIGH F2F 7D: CPT | Performed by: INTERNAL MEDICINE

## 2025-05-23 NOTE — PROGRESS NOTES
Transition of Care Visit:  Name: Odilon Hess      : 1965      MRN: [id number]  Encounter Provider: Abdifatah Schmidt MD  Encounter Date: 2025   Encounter department: [facility]    Assessment & Plan  NSTEMI (non-ST elevated myocardial infarction) (HCC)         Coronary artery disease involving native coronary artery of native heart without angina pectoris         Hyperlipidemia, unspecified hyperlipidemia type         Celiac artery stenosis (HCC)    Orders:    Ambulatory Referral to Vascular Surgery; Future    Left inguinal hernia    Orders:    Ambulatory Referral to General Surgery; Future         History of Present Illness     Transitional Care Management Review:   Odilon Hess is a 59 y.o. male here for TCM follow up.     During the TCM phone call patient stated:  TCM Call (since 2025)       Date and time call was made  2025 12:15 PM    Hospital care reviewed  Records reviewed    Patient was hospitialized at  [facility]    Date of Admission  25    Date of discharge  25    Diagnosis  NSTEMI    Disposition  Home    Were the patients medications reviewed and updated  No    Current Symptoms  Fatigue; Constipation  last bowel movement Monday          TCM Call (since 2025)       Post hospital issues  Reduced activity; Poor ADL (Activities of Daily Living) performance    Scheduled for follow up?  Yes    Did you obtain your prescribed medications  Yes    Do you need help managing your prescriptions or medications  No    Is transportation to your appointment needed  No    I have advised the patient to call PCP with any new or worsening symptoms  Ck Peña CMA    Living Arrangements  Spouse or Significiant other    Support System  Spouse    Do you have social support  Yes, as much as I need          HPI  Review of Systems   Constitutional:  Negative for appetite change, chills, fatigue and fever.   HENT:  Negative for sore throat and  "trouble swallowing.    Eyes:  Negative for redness.   Respiratory:  Negative for shortness of breath.    Cardiovascular:  Negative for chest pain and palpitations.   Gastrointestinal:  Negative for abdominal pain, constipation and diarrhea.   Genitourinary:  Negative for dysuria and hematuria.   Musculoskeletal:  Negative for back pain and neck pain.   Skin:  Negative for rash.   Neurological:  Negative for seizures, weakness and headaches.   Hematological:  Negative for adenopathy.   Psychiatric/Behavioral:  Negative for confusion. The patient is not nervous/anxious.      Objective   /80 (BP Location: Left arm, Patient Position: Sitting, Cuff Size: Standard)   Pulse 74   Temp 98.5 °F (36.9 °C) (Temporal)   Ht 6' 2\" (1.88 m)   Wt 96.6 kg (213 lb)   SpO2 99%   BMI 27.35 kg/m²     Physical Exam  Vitals and nursing note reviewed.   Constitutional:       General: He is not in acute distress.     Appearance: He is well-developed.   HENT:      Head: Normocephalic and atraumatic.     Eyes:      Conjunctiva/sclera: Conjunctivae normal.       Cardiovascular:      Rate and Rhythm: Normal rate and regular rhythm.      Heart sounds: No murmur heard.  Pulmonary:      Effort: Pulmonary effort is normal. No respiratory distress.      Breath sounds: Normal breath sounds.   Abdominal:      Palpations: Abdomen is soft.      Tenderness: There is no abdominal tenderness.      Hernia: A hernia is present.     Musculoskeletal:         General: No swelling.      Cervical back: Neck supple.     Skin:     General: Skin is warm and dry.      Capillary Refill: Capillary refill takes less than 2 seconds.     Neurological:      Mental Status: He is alert.     Psychiatric:         Mood and Affect: Mood normal.     Medications have been reviewed by provider in current encounter      "

## 2025-05-23 NOTE — TELEPHONE ENCOUNTER
Pt called to request Dr note from 5/20 with a return date of 5/ 27, last seen 5/23. Pt is requesting it be emailed, if not placed on [employer]

## 2025-05-27 ENCOUNTER — TELEPHONE (OUTPATIENT)
Dept: CARDIOLOGY CLINIC | Facility: CLINIC | Age: 60
End: 2025-05-27

## 2025-05-27 NOTE — TELEPHONE ENCOUNTER
----- Message from Emiliana Trevino DO sent at 5/21/2025 11:51 AM EDT -----  Please arrange post MRI follow-up with Nathaly Alston, or one of the docs in 2 to 4 weeks, thank you

## 2025-05-29 ENCOUNTER — OFFICE VISIT (OUTPATIENT)
Dept: VASCULAR SURGERY | Facility: CLINIC | Age: 60
End: 2025-05-29
Payer: COMMERCIAL

## 2025-05-29 VITALS
WEIGHT: 213 LBS | HEART RATE: 61 BPM | SYSTOLIC BLOOD PRESSURE: 160 MMHG | BODY MASS INDEX: 27.35 KG/M2 | OXYGEN SATURATION: 98 % | DIASTOLIC BLOOD PRESSURE: 110 MMHG

## 2025-05-29 DIAGNOSIS — R93.89 ABNORMAL CT SCAN: Primary | ICD-10-CM

## 2025-05-29 PROCEDURE — 99204 OFFICE O/P NEW MOD 45 MIN: CPT | Performed by: STUDENT IN AN ORGANIZED HEALTH CARE EDUCATION/TRAINING PROGRAM

## 2025-05-29 NOTE — PROGRESS NOTES
Vascular Surgery New Patient Visit  Date: 05/29/25      Assessment:  Odilon Hess is a 59 y.o. male with incidentally identified proximal celiac artery stenosis without atherosclerotic disease on recent CTA. On review of prior cross sectional imaging, he has normal caliber/appearance of his celiac artery. We discussed this likely represents physiologic variant due to compression of the celiac artery at the time the CT was obtained.     Plan:  -Follow up as needed. Discussed that if he were to have return of abdominal symptoms or other concerns develop we would be happy to see him back.     Diagnoses and all orders for this visit:    Abnormal CT scan    Other orders  -     Ambulatory Referral to Vascular Surgery           Subjective:     HPI:  Odilon Hess is a 59 y.o. male who recently underwent cardiac cath/PCI for sudden onset chest pain. Following intervention is pain resolved and he has had no recurrence of symptoms since. CTA was obtained during his workup, which demonstrated findings concerning for proximal celiac artery stenosis. Denies sx of mesenteric ischemia or recent unintentional weight loss. On review of his CTA, there appears to be some narrowing of the proximal celiac artery without any evidence of atherosclerotic disease.      Objective:    ROS:  All systems were reviewed and are negative except those mentioned in HPI and below.      Vitals: BP (!) 160/110 (BP Location: Left arm, Patient Position: Sitting, Cuff Size: Standard)   Pulse 61   Wt 96.6 kg (213 lb)   SpO2 98%   BMI 27.35 kg/m²      General: male appears stated age, no apparent distress, alert and oriented   HEENT: normocephalic, atraumatic   Cardiovascular: hemodynamically stable   Chest/Lungs: no increased work of breathing, chest rise equal bilaterally   Abdomen: Soft, ND, NT, no pulsatile masses   Extremities: BL femoral pulses   Skin: warm and dry   Neuro: no gross deficits      Medications:  Current Outpatient Medications    Medication Sig Dispense Refill    aspirin 81 mg chewable tablet Chew 1 tablet (81 mg total) daily 90 tablet 0    atorvastatin (LIPITOR) 40 mg tablet Take 1 tablet (40 mg total) by mouth daily 90 tablet 0    clopidogrel (Plavix) 75 mg tablet Take 1 tablet (75 mg total) by mouth daily 90 tablet 0    Coenzyme Q10 (CO Q 10 PO) Take by mouth in the morning.      Cyanocobalamin (VITAMIN B-12 PO) Take by mouth in the morning.      metoprolol succinate (TOPROL-XL) 25 mg 24 hr tablet Take 1 tablet (25 mg total) by mouth daily 90 tablet 0    Multiple Vitamin (MULTIVITAMIN) capsule Take 1 capsule by mouth in the morning.      Omega-3 Fatty Acids (FISH OIL) 1,000 mg Take 1,000 mg by mouth in the morning.      VITAMIN D PO Take by mouth in the morning.       No current facility-administered medications for this visit.       Allergies:  Allergies[1]     PMH:  Past Medical History[2]     PSH:  Past Surgical History[3]     FHx:  Family History[4]     SHx:  Social History     Socioeconomic History    Marital status: /Civil Union     Spouse name: Not on file    Number of children: Not on file    Years of education: Not on file    Highest education level: Not on file   Occupational History    Not on file   Tobacco Use    Smoking status: Never    Smokeless tobacco: Never   Vaping Use    Vaping status: Never Used   Substance and Sexual Activity    Alcohol use: Yes     Alcohol/week: 7.0 standard drinks of alcohol     Types: 5 Glasses of wine, 2 Cans of beer per week     Comment: social    Drug use: No    Sexual activity: Yes     Partners: Female     Birth control/protection: Male Sterilization   Other Topics Concern    Not on file   Social History Narrative    Alcohol: No - As per [employer]     Social Drivers of Health     Financial Resource Strain: Not on file   Food Insecurity: No Food Insecurity (5/20/2025)    Nursing - Inadequate Food Risk Classification     Worried About Running Out of Food in the Last Year: Not on  file     Ran Out of Food in the Last Year: Not on file     Ran Out of Food in the Last Year: Never true   Transportation Needs: No Transportation Needs (2025)    Nursing - Transportation Risk Classification     Lack of Transportation: Not on file     Lack of Transportation: No   Physical Activity: Not on file   Stress: Not on file   Social Connections: Not on file   Intimate Partner Violence: Unknown (2025)    Nursing IPS     Feels Physically and Emotionally Safe: Not on file     Physically Hurt by Someone: Not on file     Humiliated or Emotionally Abused by Someone: Not on file     Physically Hurt by Someone: No     Hurt or Threatened by Someone: No   Housing Stability: Unknown (2025)    Nursing: Inadequate Housing Risk Classification     Has Housing: Not on file     Worried About Losing Housing: Not on file     Unable to Get Utilities: Not on file     Unable to Pay for Housing in the Last Year: No     Has Housin          [1] No Known Allergies  [2]   Past Medical History:  Diagnosis Date    COVID-19     No pertinent past medical history    [3]   Past Surgical History:  Procedure Laterality Date    CARDIAC CATHETERIZATION N/A 2025    Procedure: Cardiac Catheterization;  Surgeon: Martha Marlow DO;  Location: [facility];  Service: Cardiology    CARDIAC CATHETERIZATION N/A 2025    Procedure: Cardiac Coronary Angiogram;  Surgeon: Martha Marlow DO;  Location: [facility];  Service: Cardiology    CARDIAC CATHETERIZATION N/A 2025    Procedure: Cardiac PCI;  Surgeon: Martha Marlow DO;  Location: [facility];  Service: Cardiology    COLONOSCOPY  02/10/2017    EYE SURGERY  2002    Lasik    HERNIA REPAIR      age 6     VASECTOMY     [4]   Family History  Problem Relation Name Age of Onset    No Known Problems Mother      Hyperlipidemia Father Dad     COPD Father Dad     No Known Problems Sister      No Known Problems Brother

## 2025-06-03 NOTE — PROGRESS NOTES
Odilon CARLSON [facility]  1965  [id number]  CARDIOVASC PHYSICIAN  [address] [address]  [phone]  [phone]    1. Coronary artery disease involving native coronary artery of native heart, unspecified whether angina present  POCT ECG    Echo follow up/limited w/ contrast if indicated      2. Mixed hyperlipidemia        3. Coronary artery disease involving native coronary artery  clopidogrel (Plavix) 75 mg tablet    aspirin 81 mg chewable tablet    metoprolol succinate (TOPROL-XL) 25 mg 24 hr tablet    atorvastatin (LIPITOR) 40 mg tablet      4. Suspected sleep apnea  Ambulatory Referral to Sleep Medicine        Assessment/Plan  TODAY (06/09/25):   No anginal symptoms today on exam. Will check echo in 3 months to assess wall motion and function. Continue DAMT for the next year and metoprolol. Encouraged cardiac rehab.  Continue atorvastatin 40 mg daily. His LDL was 71 would hope to get this < 45.  Will give referral to sleep medicine given his history of snoring and waking up choking for air.  RTO 09/16/25 with me. We discussed that he has a hernia and at this time would not be able to come off DAPT for at least a year. Only able to come off it in emergent situations.   Coronary artery disease   - NSTEMI 05/20/25: s/p PCI/DIANNE x 1 to the dLCx with 55% stenosis in the pLAD, 20% stenosis in the mRCA  - current rx: ASA 81 mg daily and Plavix 75 mg daily  Hyperlipidemia   - lipid panel 05/21/25: cholesterol 140, triglycerides 157, HDL 38, LDL 71  - current rx: atorvastatin 80 mg daily  Suspected sleep apnea     Other cardiac testing:  TTE 05/20/25: LVEF 50%, wall thickness mildly increases, global longitudinal strain reduced at -12%, inferior hypokinesis, grade I DD, mild MAC, mild MR, mild TR    Interval History:   This is a 60 y/o male with a PMH of CAD s/p PCI/DIANNE to the dLCx and HLD who is presenting today for hospital follow up. He was admitted to [facility] 05/20/25-05/21/25 with chest discomfort and elevated  troponin. Underwent cardiac catheterization an had successful stenting to the dLCx. Started on DAPT and atorvastatin.    Pt states he has been doing well since his heart attack. He did not have a work out routine prior to his heart attack. Noticed he does get a little bit of chest discomfort now with walking. Has been either walking or riding his bike. I encouraged him to start slowly and work his way up to more rigorous exercise activities.  He eats a pretty clean diet and read labels constantly. Avoid high fructose corn syrup and sugar. Uses avocado oil and olive oil. Eats more chicken than anything, may eat red meat once a week. Taking all his medications as directed. Reviewed the importance of DAPT. Reviewed why he is on metoprolol and atorvastatin. He has no family history of CAD. He is a nonsmoker and has a glass of wine with dinner. He does plan to start cardiac rehab. He admits that he snores at night and avoid sleeping on his back as he woke up gasping for air at one point. Has never been testing for sleep apnea.    Past Medical History[1]  Social History     Socioeconomic History   • Marital status: /Civil Union     Spouse name: Not on file   • Number of children: Not on file   • Years of education: Not on file   • Highest education level: Not on file   Occupational History   • Not on file   Tobacco Use   • Smoking status: Never   • Smokeless tobacco: Never   Vaping Use   • Vaping status: Never Used   Substance and Sexual Activity   • Alcohol use: Yes     Alcohol/week: 7.0 standard drinks of alcohol     Types: 5 Glasses of wine, 2 Cans of beer per week     Comment: social   • Drug use: No   • Sexual activity: Yes     Partners: Female     Birth control/protection: Male Sterilization   Other Topics Concern   • Not on file   Social History Narrative    Alcohol: No - As per LOFTY     Social Drivers of Health     Financial Resource Strain: Not on file   Food Insecurity: No Food Insecurity  (2025)    Nursing - Inadequate Food Risk Classification    • Worried About Running Out of Food in the Last Year: Not on file    • Ran Out of Food in the Last Year: Not on file    • Ran Out of Food in the Last Year: Never true   Transportation Needs: No Transportation Needs (2025)    Nursing - Transportation Risk Classification    • Lack of Transportation: Not on file    • Lack of Transportation: No   Physical Activity: Not on file   Stress: Not on file   Social Connections: Not on file   Intimate Partner Violence: Unknown (2025)    Nursing IPS    • Feels Physically and Emotionally Safe: Not on file    • Physically Hurt by Someone: Not on file    • Humiliated or Emotionally Abused by Someone: Not on file    • Physically Hurt by Someone: No    • Hurt or Threatened by Someone: No   Housing Stability: Unknown (2025)    Nursing: Inadequate Housing Risk Classification    • Has Housing: Not on file    • Worried About Losing Housing: Not on file    • Unable to Get Utilities: Not on file    • Unable to Pay for Housing in the Last Year: No    • Has Housin      Family History[2]  Past Surgical History[3]  Current Medications[4]  No Known Allergies    Labs:     Chemistry        Component Value Date/Time    K 4.1 2025 0506    K 4.6 2024 0721    K 4.8 2022 0830     2025 0506     2024 0721     2022 0830    CO2 25 2025 0506    CO2 31 2024 0721    CO2 29 2022 0830    BUN 12 2025 0506    BUN 16 2024 0721    BUN 20 2022 0830    CREATININE 0.90 2025 0506    CREATININE 1.11 2022 0830        Component Value Date/Time    CALCIUM 8.9 2025 0506    CALCIUM 9.1 2024 0721    CALCIUM 8.8 2022 0830    ALKPHOS 56 2025 1045    ALKPHOS 54 2024 0721    ALKPHOS 64 2022 0830    AST 39 2025 1045    AST 19 2024 0721    AST 15 2022 0830    ALT 23 2025 1045    ALT 20  11/23/2024 0721    ALT 35 07/09/2022 0830        Lab Results   Component Value Date    HDL 38 (L) 05/21/2025    HDL 37 11/23/2024     Lab Results   Component Value Date    LDLCALC 71 05/21/2025    LDLCALC 103 11/23/2024     Lab Results   Component Value Date    TRIG 157 (H) 05/21/2025    TRIG 211 (H) 11/23/2024     Imaging: Cardiac catheterization  Result Date: 5/20/2025  Narrative: •  S/P PCI with DIANNE x 1 to the Distal LCX 80% stenosis and culprit for his NSTEMI •  LVEDP is normal without gradient on LV-AO pullback     Echo complete w/ contrast if indicated  Result Date: 5/20/2025  Narrative: •  Left Ventricle: Left ventricular cavity size is normal. Wall thickness is mildly increased. The left ventricular ejection fraction is 50% by visual estimation. Systolic function is low normal. Global longitudinal strain is reduced at -12%. Diastolic function is mildly abnormal, consistent with grade I (abnormal) relaxation. •  Right Ventricle: Right ventricular cavity size is mildly dilated. •  Mitral Valve: There is mild annular calcification. There is mild regurgitation with an eccentrically directed jet. •  Tricuspid Valve: There is mild regurgitation. The tricuspid valve regurgitation jet is eccentric. •  The following segments are hypokinetic: basal inferolateral, mid inferolateral, mid anterolateral, apical anterior, apical inferior, apical lateral and apex. •  Other segments could not be evaluated.     XR chest 2 views  Result Date: 5/20/2025  Narrative: XR CHEST PA AND LATERAL dual-energy INDICATION: Chest pain. COMPARISON: Chest x-ray January 2, 2024, CTA of the chest the same day FINDINGS: Clear lungs. No pneumothorax or pleural effusion. Normal cardiomediastinal silhouette. Age-related degenerative changes in the spine. Normal upper abdomen.     Impression: No acute cardiopulmonary disease. Workstation performed: [id number]     CTA dissection protocol chest/abdomen/pelvis  Result Date: 5/20/2025  Narrative: CTA -  CHEST, ABDOMEN AND PELVIS - WITHOUT AND WITH IV CONTRAST INDICATION: CP/abdominal pain/back pain, elevated troponin. COMPARISON: Chest x-ray 5/20/2025 and CT coronary calcium study from 1/27/2022 TECHNIQUE: CT examination of the chest, abdomen and pelvis was performed both prior to and after the administration of intravenous contrast. The noncontrast portion of this examination was performed utilizing low radiation dose technique.  Thin section angiographic arterial phase post contrast technique was used in order to evaluate for aortic dissection. 3D reformatted images and volume rendering were performed on an independent workstation. Multiplanar 2D reformatted images were created from the source data. Radiation dose length product (DLP) for this visit: 1560 mGy-cm. . This examination, like all CT scans performed in the [facility], was performed utilizing techniques to minimize radiation dose exposure, including the use of iterative reconstruction and automated exposure control. IV Contrast: 100 mL of iohexol (OMNIPAQUE) Enteric Contrast: Not administered. FINDINGS: AORTA: No aortic dissection or intramural hematoma. No aortic aneurysm. Mild atherosclerotic disease. No flow limiting atherosclerotic stenosis of aorta or major aortic branch vessel in the chest, abdomen or pelvis. 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 CHEST LUNGS: Lungs are clear. No tracheal or endobronchial lesion. PLEURA: Unremarkable. HEART/PULMONARY ARTERIAL TREE: Scattered atherosclerotic coronary artery calcification. No central pulmonary arterial filling defects. MEDIASTINUM AND IFRAH: Unremarkable. CHEST WALL AND LOWER NECK: Unremarkable. ABDOMEN LIVER/BILIARY TREE: Unremarkable. GALLBLADDER: No calcified gallstones. No pericholecystic inflammatory change. SPLEEN: Unremarkable. PANCREAS: Unremarkable. ADRENAL GLANDS: Unremarkable. KIDNEYS/URETERS: Unremarkable. No hydronephrosis.  "STOMACH AND BOWEL: Unremarkable. Small gastric diverticulum. Moderate stool burden. APPENDIX: No findings to suggest appendicitis. ABDOMINOPELVIC CAVITY: No ascites. No pneumoperitoneum. No lymphadenopathy. PELVIS REPRODUCTIVE ORGANS: Mild prostate enlargement. URINARY BLADDER: Unremarkable. ABDOMINAL WALL/INGUINAL REGIONS: Small fat-containing left inguinal hernia. Small fat-containing umbilical hernia. BONES: No acute fracture or suspicious osseous lesion. Spinal degenerative changes.     Impression: 1.  No aortic dissection or intramural hematoma. 2.  No acute process in the chest, abdomen, or pelvis. 3.  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 4.  Scattered coronary artery disease. Workstation performed: [id number]       ECG:  Normal sinus rhythm with septal infarct and T wave abnormality  in lateral leads       Review of Systems   Constitutional: Negative for chills, diaphoresis, fever, malaise/fatigue and weight gain.   Cardiovascular:  Negative for chest pain (discomfort, mild while exercising), dyspnea on exertion, irregular heartbeat, leg swelling, near-syncope, orthopnea, palpitations, paroxysmal nocturnal dyspnea and syncope.   Respiratory:  Negative for cough, shortness of breath, sleep disturbances due to breathing, snoring and wheezing.    Skin:  Negative for rash.   Gastrointestinal:  Negative for bloating, abdominal pain and nausea.   Neurological:  Negative for dizziness and light-headedness.       Vitals:    06/09/25 1550   BP: 126/90   Pulse: 71     Vitals:    06/09/25 1550   Weight: 95.9 kg (211 lb 6.4 oz)     Height: 6' 2\" (188 cm)   Body mass index is 27.14 kg/m².    Physical Exam  Vitals and nursing note reviewed.   Constitutional:       General: He is not in acute distress.     Appearance: Normal appearance. He is not ill-appearing.   HENT:      Head: Normocephalic and atraumatic.      Ears:    "   Comments: Kwabena's sign     Nose: Nose normal.      Mouth/Throat:      Mouth: Mucous membranes are moist.     Eyes:      Conjunctiva/sclera: Conjunctivae normal.       Cardiovascular:      Rate and Rhythm: Normal rate and regular rhythm.      Pulses:           Radial pulses are 2+ on the right side and 2+ on the left side.      Heart sounds: S1 normal and S2 normal. No murmur heard.  Pulmonary:      Effort: Pulmonary effort is normal. No respiratory distress.      Breath sounds: Normal breath sounds. No stridor. No wheezing, rhonchi or rales.   Abdominal:      General: There is no distension.     Musculoskeletal:      Cervical back: Neck supple.      Right lower leg: No edema.      Left lower leg: No edema.     Skin:     General: Skin is warm.      Capillary Refill: Capillary refill takes less than 2 seconds.     Neurological:      General: No focal deficit present.      Mental Status: He is alert.     Psychiatric:         Thought Content: Thought content normal.                [1]  Past Medical History:  Diagnosis Date   • COVID-19    • No pertinent past medical history    [2]  Family History  Problem Relation Name Age of Onset   • No Known Problems Mother     • Hyperlipidemia Father Dad    • COPD Father Dad    • No Known Problems Sister     • No Known Problems Brother     [3]  Past Surgical History:  Procedure Laterality Date   • CARDIAC CATHETERIZATION N/A 5/20/2025    Procedure: Cardiac Catheterization;  Surgeon: Martha Marlow DO;  Location: [facility];  Service: Cardiology   • CARDIAC CATHETERIZATION N/A 5/20/2025    Procedure: Cardiac Coronary Angiogram;  Surgeon: Martha Marlow DO;  Location: [facility];  Service: Cardiology   • CARDIAC CATHETERIZATION N/A 5/20/2025    Procedure: Cardiac PCI;  Surgeon: Martha Marlow DO;  Location: [facility];  Service: Cardiology   • COLONOSCOPY  02/10/2017   • EYE SURGERY  2002    Lasik   • HERNIA REPAIR      age 6    • VASECTOMY      [4]    Current Outpatient Medications:   •  aspirin 81 mg chewable tablet, Chew 1 tablet (81 mg total) daily, Disp: 30 tablet, Rfl: 11  •  atorvastatin (LIPITOR) 40 mg tablet, Take 1 tablet (40 mg total) by mouth daily, Disp: 30 tablet, Rfl: 11  •  clopidogrel (Plavix) 75 mg tablet, Take 1 tablet (75 mg total) by mouth daily, Disp: 30 tablet, Rfl: 11  •  Coenzyme Q10 (CO Q 10 PO), Take by mouth in the morning., Disp: , Rfl:   •  Cyanocobalamin (VITAMIN B-12 PO), Take by mouth in the morning., Disp: , Rfl:   •  metoprolol succinate (TOPROL-XL) 25 mg 24 hr tablet, Take 1 tablet (25 mg total) by mouth daily, Disp: 90 tablet, Rfl: 1  •  Multiple Vitamin (MULTIVITAMIN) capsule, Take 1 capsule by mouth in the morning., Disp: , Rfl:   •  Omega-3 Fatty Acids (FISH OIL) 1,000 mg, Take 1,000 mg by mouth in the morning., Disp: , Rfl:   •  VITAMIN D PO, Take by mouth in the morning., Disp: , Rfl:

## 2025-06-09 ENCOUNTER — TELEPHONE (OUTPATIENT)
Dept: CARDIOLOGY CLINIC | Facility: CLINIC | Age: 60
End: 2025-06-09

## 2025-06-09 ENCOUNTER — OFFICE VISIT (OUTPATIENT)
Dept: CARDIOLOGY CLINIC | Facility: CLINIC | Age: 60
End: 2025-06-09
Payer: COMMERCIAL

## 2025-06-09 VITALS
WEIGHT: 211.4 LBS | BODY MASS INDEX: 27.13 KG/M2 | SYSTOLIC BLOOD PRESSURE: 126 MMHG | HEART RATE: 71 BPM | HEIGHT: 74 IN | DIASTOLIC BLOOD PRESSURE: 90 MMHG

## 2025-06-09 DIAGNOSIS — I25.10 CORONARY ARTERY DISEASE INVOLVING NATIVE CORONARY ARTERY OF NATIVE HEART, UNSPECIFIED WHETHER ANGINA PRESENT: Primary | ICD-10-CM

## 2025-06-09 DIAGNOSIS — I25.10 CORONARY ARTERY DISEASE INVOLVING NATIVE CORONARY ARTERY: ICD-10-CM

## 2025-06-09 DIAGNOSIS — E78.2 MIXED HYPERLIPIDEMIA: ICD-10-CM

## 2025-06-09 DIAGNOSIS — R29.818 SUSPECTED SLEEP APNEA: ICD-10-CM

## 2025-06-09 PROCEDURE — 93000 ELECTROCARDIOGRAM COMPLETE: CPT

## 2025-06-09 PROCEDURE — 99214 OFFICE O/P EST MOD 30 MIN: CPT

## 2025-06-09 RX ORDER — METOPROLOL SUCCINATE 25 MG/1
25 TABLET, EXTENDED RELEASE ORAL DAILY
Qty: 90 TABLET | Refills: 1 | Status: SHIPPED | OUTPATIENT
Start: 2025-06-09

## 2025-06-09 RX ORDER — ASPIRIN 81 MG/1
81 TABLET, CHEWABLE ORAL DAILY
Qty: 30 TABLET | Refills: 11 | Status: SHIPPED | OUTPATIENT
Start: 2025-06-09

## 2025-06-09 RX ORDER — CLOPIDOGREL BISULFATE 75 MG/1
75 TABLET ORAL DAILY
Qty: 30 TABLET | Refills: 11 | Status: SHIPPED | OUTPATIENT
Start: 2025-06-09

## 2025-06-09 RX ORDER — ATORVASTATIN CALCIUM 40 MG/1
40 TABLET, FILM COATED ORAL DAILY
Qty: 30 TABLET | Refills: 11 | Status: SHIPPED | OUTPATIENT
Start: 2025-06-09

## 2025-06-12 ENCOUNTER — OFFICE VISIT (OUTPATIENT)
Dept: INTERNAL MEDICINE CLINIC | Facility: CLINIC | Age: 60
End: 2025-06-12
Payer: COMMERCIAL

## 2025-06-12 ENCOUNTER — TELEPHONE (OUTPATIENT)
Age: 60
End: 2025-06-12

## 2025-06-12 VITALS
HEART RATE: 90 BPM | BODY MASS INDEX: 27.08 KG/M2 | DIASTOLIC BLOOD PRESSURE: 90 MMHG | SYSTOLIC BLOOD PRESSURE: 142 MMHG | HEIGHT: 74 IN | TEMPERATURE: 98.7 F | WEIGHT: 211 LBS | OXYGEN SATURATION: 99 %

## 2025-06-12 DIAGNOSIS — R05.9 COUGH IN ADULT: ICD-10-CM

## 2025-06-12 DIAGNOSIS — I25.10 CORONARY ARTERY DISEASE INVOLVING NATIVE CORONARY ARTERY OF NATIVE HEART WITHOUT ANGINA PECTORIS: ICD-10-CM

## 2025-06-12 DIAGNOSIS — U07.1 COVID-19: Primary | ICD-10-CM

## 2025-06-12 LAB
S PYO AG THROAT QL: NEGATIVE
SARS-COV-2 AG UPPER RESP QL IA: POSITIVE
VALID CONTROL: ABNORMAL

## 2025-06-12 PROCEDURE — 99214 OFFICE O/P EST MOD 30 MIN: CPT | Performed by: INTERNAL MEDICINE

## 2025-06-12 PROCEDURE — 87811 SARS-COV-2 COVID19 W/OPTIC: CPT | Performed by: INTERNAL MEDICINE

## 2025-06-12 PROCEDURE — 87880 STREP A ASSAY W/OPTIC: CPT | Performed by: INTERNAL MEDICINE

## 2025-06-12 RX ORDER — NIRMATRELVIR AND RITONAVIR 300-100 MG
3 KIT ORAL 2 TIMES DAILY
Qty: 30 TABLET | Refills: 0 | Status: SHIPPED | OUTPATIENT
Start: 2025-06-12 | End: 2025-06-12

## 2025-06-12 NOTE — PROGRESS NOTES
Assessment/Plan:           1. COVID-19  Comments:  Hydration with electrolytes vitamin C and zinc advised.  Orders:  -     POCT Rapid Covid Ag  2. Cough in adult  Comments:  Delsym advised.  Orders:  -     POCT rapid ANTIGEN strepA  3. Coronary artery disease involving native coronary artery of native heart without angina pectoris  Comments:  Condition was discussed with cardiology.  Antiviral not recommended.         1. COVID-19 (Primary)    - nirmatrelvir & ritonavir (Paxlovid, 300/100,) tablet therapy pack; Take 3 tablets by mouth 2 (two) times a day for 5 days  Dispense: 30 tablet; Refill: 0       No problem-specific Assessment & Plan notes found for this encounter.           Subjective:      Patient ID: Odilon Hess is a 60 y.o. male.    HPI    The following portions of the patient's history were reviewed and updated as appropriate: He  has a past medical history of COVID-19 and No pertinent past medical history.  He   Patient Active Problem List    Diagnosis Date Noted    NSTEMI (non-ST elevated myocardial infarction) (HCC) 05/20/2025    Hyperlipidemia 05/20/2025    Abnormal CT scan 05/20/2025    Coronary artery disease involving native coronary artery 05/20/2025     He  has a past surgical history that includes Hernia repair; Colonoscopy (02/10/2017); Vasectomy; Eye surgery (2002); Cardiac catheterization (N/A, 5/20/2025); Cardiac catheterization (N/A, 5/20/2025); and Cardiac catheterization (N/A, 5/20/2025).  His family history includes COPD in his father; Hyperlipidemia in his father; No Known Problems in his brother, mother, and sister.  He  reports that he has never smoked. He has never used smokeless tobacco. He reports current alcohol use of about 7.0 standard drinks of alcohol per week. He reports that he does not use drugs.  Current Outpatient Medications   Medication Sig Dispense Refill    aspirin 81 mg chewable tablet Chew 1 tablet (81 mg total) daily 30 tablet 11    atorvastatin (LIPITOR) 40 mg  tablet Take 1 tablet (40 mg total) by mouth daily 30 tablet 11    clopidogrel (Plavix) 75 mg tablet Take 1 tablet (75 mg total) by mouth daily 30 tablet 11    Coenzyme Q10 (CO Q 10 PO) Take by mouth in the morning.      Cyanocobalamin (VITAMIN B-12 PO) Take by mouth in the morning.      metoprolol succinate (TOPROL-XL) 25 mg 24 hr tablet Take 1 tablet (25 mg total) by mouth daily 90 tablet 1    Multiple Vitamin (MULTIVITAMIN) capsule Take 1 capsule by mouth in the morning.      Omega-3 Fatty Acids (FISH OIL) 1,000 mg Take 1,000 mg by mouth in the morning.      VITAMIN D PO Take by mouth in the morning.       No current facility-administered medications for this visit.     Current Outpatient Medications on File Prior to Visit   Medication Sig    aspirin 81 mg chewable tablet Chew 1 tablet (81 mg total) daily    atorvastatin (LIPITOR) 40 mg tablet Take 1 tablet (40 mg total) by mouth daily    clopidogrel (Plavix) 75 mg tablet Take 1 tablet (75 mg total) by mouth daily    Coenzyme Q10 (CO Q 10 PO) Take by mouth in the morning.    Cyanocobalamin (VITAMIN B-12 PO) Take by mouth in the morning.    metoprolol succinate (TOPROL-XL) 25 mg 24 hr tablet Take 1 tablet (25 mg total) by mouth daily    Multiple Vitamin (MULTIVITAMIN) capsule Take 1 capsule by mouth in the morning.    Omega-3 Fatty Acids (FISH OIL) 1,000 mg Take 1,000 mg by mouth in the morning.    VITAMIN D PO Take by mouth in the morning.     No current facility-administered medications on file prior to visit.     Medications Discontinued During This Encounter   Medication Reason    nirmatrelvir & ritonavir (Paxlovid, 300/100,) tablet therapy pack       He has no known allergies..    Review of Systems   Constitutional:  Negative for appetite change, chills, fatigue and fever.   HENT:  Positive for congestion and sore throat. Negative for trouble swallowing.    Eyes:  Negative for redness.   Respiratory:  Positive for cough. Negative for shortness of breath.   "  Cardiovascular:  Negative for chest pain and palpitations.   Gastrointestinal:  Negative for abdominal pain, constipation and diarrhea.   Genitourinary:  Negative for dysuria and hematuria.   Musculoskeletal:  Negative for back pain and neck pain.   Skin:  Negative for rash.   Neurological:  Negative for seizures, weakness and headaches.   Hematological:  Negative for adenopathy.   Psychiatric/Behavioral:  Negative for confusion. The patient is not nervous/anxious.          Objective:      /90 (BP Location: Left arm, Patient Position: Sitting, Cuff Size: Standard)   Pulse 90   Temp 98.7 °F (37.1 °C) (Temporal)   Ht 6' 2\" (1.88 m)   Wt 95.7 kg (211 lb)   SpO2 99%   BMI 27.09 kg/m²     Results Reviewed       None            Recent Results (from the past 8 weeks)   ECG 12 lead    Collection Time: 05/20/25 10:07 AM   Result Value Ref Range    Ventricular Rate 70 BPM    Atrial Rate 70 BPM    [place] Interval 156 ms    QRSD Interval 82 ms    QT Interval 414 ms    QTC Interval 447 ms    P Axis 60 degrees    QRS Axis 43 degrees    T Wave Axis 43 degrees   CBC and differential    Collection Time: 05/20/25 10:45 AM   Result Value Ref Range    WBC 3.81 (L) 4.31 - 10.16 Thousand/uL    RBC 5.28 3.88 - 5.62 Million/uL    Hemoglobin 15.1 12.0 - 17.0 g/dL    Hematocrit 45.6 36.5 - 49.3 %    MCV 86 82 - 98 fL    MCH 28.6 26.8 - 34.3 pg    MCHC 33.1 31.4 - 37.4 g/dL    RDW 12.4 11.6 - 15.1 %    MPV 9.9 8.9 - 12.7 fL    Platelets 230 149 - 390 Thousands/uL    Segmented % 66 43 - 75 %    Lymphocytes % 25 14 - 44 %    Monocytes % 8 4 - 12 %    Eosinophils Relative 1 0 - 6 %    Basophils Relative 0 0 - 1 %   Comprehensive metabolic panel    Collection Time: 05/20/25 10:45 AM   Result Value Ref Range    Sodium 140 135 - 147 mmol/L    Potassium 3.9 3.5 - 5.3 mmol/L    Chloride 104 96 - 108 mmol/L    CO2 29 21 - 32 mmol/L    ANION GAP 7 4 - 13 mmol/L    BUN 15 5 - 25 mg/dL    Creatinine 1.04 0.60 - 1.30 mg/dL    Glucose 111 65 - " "140 mg/dL    Calcium 9.6 8.4 - 10.2 mg/dL    AST 39 13 - 39 U/L    ALT 23 7 - 52 U/L    Alkaline Phosphatase 56 34 - 104 U/L    Total Protein 6.7 6.4 - 8.4 g/dL    Albumin 4.4 3.5 - 5.0 g/dL    Total Bilirubin 0.58 0.20 - 1.00 mg/dL    eGFR 78 ml/min/1.73sq m   Lipase    Collection Time: 05/20/25 10:45 AM   Result Value Ref Range    Lipase 26 11 - 82 u/L   HS Troponin 0hr (reflex protocol)    Collection Time: 05/20/25 10:45 AM   Result Value Ref Range    hs TnI 0hr 2,321 (H) \"Refer to ACS Flowchart\"- see link ng/L   APTT    Collection Time: 05/20/25 11:43 AM   Result Value Ref Range    PTT 27 23 - 34 seconds   Protime-INR    Collection Time: 05/20/25 11:43 AM   Result Value Ref Range    Protime 13.6 12.3 - 15.0 seconds    INR 1.02 0.85 - 1.19   ECG 12 lead    Collection Time: 05/20/25 12:03 PM   Result Value Ref Range    Ventricular Rate 76 BPM    Atrial Rate 76 BPM    GA Interval 146 ms    QRSD Interval 84 ms    QT Interval 396 ms    QTC Interval 445 ms    P Axis 74 degrees    QRS Axis 63 degrees    T Wave [place] 63 degrees   HS Troponin I 2hr    Collection Time: 05/20/25 12:42 PM   Result Value Ref Range    hs TnI 2hr 3,268 (H) \"Refer to ACS Flowchart\"- see link ng/L    Delta 2hr hsTnI 947 (H) <20 ng/L   ECG 12 lead    Collection Time: 05/20/25 12:43 PM   Result Value Ref Range    Ventricular Rate 70 BPM    Atrial Rate 70 BPM    GA Interval 150 ms    QRSD Interval 82 ms    QT Interval 400 ms    QTC Interval 432 ms    P [place] 69 degrees    QRS Axis 57 degrees    T Wave Axis 45 degrees   Echo complete w/ contrast if indicated    Collection Time: 05/20/25  2:02 PM   Result Value Ref Range    BSA 2.24 m2    LV EF 50     Est. RA pres 3.0 mmHg    GLS -12 %   POCT activated clotting time    Collection Time: 05/20/25  2:52 PM   Result Value Ref Range    Activated Clotting Time, i-STAT 306 (H) 89 - 137 sec    Specimen Type VENOUS    Lipid Panel with Direct LDL reflex    Collection Time: 05/21/25  5:06 AM   Result Value Ref " Range    Cholesterol 140 See Comment mg/dL    Triglycerides 157 (H) See Comment mg/dL    HDL, Direct 38 (L) >=40 mg/dL    LDL Calculated 71 0 - 100 mg/dL   CBC    Collection Time: 05/21/25  5:06 AM   Result Value Ref Range    WBC 8.57 4.31 - 10.16 Thousand/uL    RBC 4.95 3.88 - 5.62 Million/uL    Hemoglobin 14.2 12.0 - 17.0 g/dL    Hematocrit 42.8 36.5 - 49.3 %    MCV 87 82 - 98 fL    MCH 28.7 26.8 - 34.3 pg    MCHC 33.2 31.4 - 37.4 g/dL    RDW 12.7 11.6 - 15.1 %    Platelets 208 149 - 390 Thousands/uL    MPV 10.0 8.9 - 12.7 fL   Basic metabolic panel    Collection Time: 05/21/25  5:06 AM   Result Value Ref Range    Sodium 139 135 - 147 mmol/L    Potassium 4.1 3.5 - 5.3 mmol/L    Chloride 107 96 - 108 mmol/L    CO2 25 21 - 32 mmol/L    ANION GAP 7 4 - 13 mmol/L    BUN 12 5 - 25 mg/dL    Creatinine 0.90 0.60 - 1.30 mg/dL    Glucose 105 65 - 140 mg/dL    Calcium 8.9 8.4 - 10.2 mg/dL    eGFR 93 ml/min/1.73sq m   POCT Rapid Covid Ag    Collection Time: 06/12/25  4:25 PM   Result Value Ref Range    POCT SARS-CoV-2 Ag Positive (A) Negative    VALID CONTROL Valid    POCT rapid ANTIGEN strepA    Collection Time: 06/12/25  4:26 PM   Result Value Ref Range     RAPID STREP A Negative Negative        Physical Exam  Constitutional:       Appearance: Normal appearance. He is normal weight.   HENT:      Head: Normocephalic and atraumatic.      Nose: Nose normal.      Mouth/Throat:      Mouth: Mucous membranes are moist.     Eyes:      Extraocular Movements: Extraocular movements intact.      Pupils: Pupils are equal, round, and reactive to light.       Cardiovascular:      Rate and Rhythm: Normal rate and regular rhythm.   Pulmonary:      Effort: Pulmonary effort is normal.      Breath sounds: Normal breath sounds.   Abdominal:      Palpations: Abdomen is soft.     Musculoskeletal:         General: Normal range of motion.      Cervical back: Normal range of motion and neck supple.     Neurological:      General: No focal deficit  present.      Mental Status: He is alert and oriented to person, place, and time. Mental status is at baseline.

## 2025-06-12 NOTE — TELEPHONE ENCOUNTER
Odilon would like to know if he needs to be seen for Dr. Schmidt to order him something for a low fever coughing and congestion. He said he is coughing up nasty stuff. He has an appointment for 6/16/25.

## 2025-06-12 NOTE — TELEPHONE ENCOUNTER
Marce from [facility] pharmacy calling with concern about interactions between Paxlovid- prescribed by PCP- with Lipitor and Plavix.   Epic Secure Chat sent to TEMI Masters, who had a previous conversation with patient's PCP and recommended patient does not take Paxlovid due to interactions.   They are recommending supportive care.   Marce aware.   TEMI Masters asked that patient be contacted. Called patient, no answer, lvm to call back.

## 2025-06-12 NOTE — TELEPHONE ENCOUNTER
Left patient message, he will need to be seen for his symptoms before Dr. Schmidt can prescribe medication for him.

## 2025-06-16 ENCOUNTER — TELEPHONE (OUTPATIENT)
Age: 60
End: 2025-06-16

## 2025-06-16 NOTE — TELEPHONE ENCOUNTER
Patient called stating seen 6/12.  Patient would like note stating it is okay for him to return to work tomorrow 6/17.  Please send to [employer].

## 2025-06-24 ENCOUNTER — CLINICAL SUPPORT (OUTPATIENT)
Dept: CARDIAC REHAB | Age: 60
End: 2025-06-24
Attending: INTERNAL MEDICINE
Payer: COMMERCIAL

## 2025-06-24 DIAGNOSIS — Z95.5 STENTED CORONARY ARTERY: Primary | ICD-10-CM

## 2025-06-24 DIAGNOSIS — I25.10 CORONARY ARTERY DISEASE INVOLVING NATIVE CORONARY ARTERY: ICD-10-CM

## 2025-06-24 PROCEDURE — 93797 PHYS/QHP OP CAR RHAB WO ECG: CPT

## 2025-06-24 NOTE — PROGRESS NOTES
CARDIAC REHABILITATION   ASSESSMENT AND INDIVIDUALIZED TREATMENT PLAN  INITIAL           Today's date: 2025   # of Exercise Sessions Completed: 1  Patient name: Odilon Hess      : 1965  Age: 60 y.o.       MRN: [id number]  Referring Physician: Dr. Marlow   Cardiologist: wilson unsure. Will send to Dr. Fernandez  Provider: Teddy  Clinician: Gay Norton MS, CEP        Treatment is tailored to this patient's individual needs.  The ITP was reviewed with the patient and all questions were answered to their satisfaction.  Additional ITP documentation can be found electronically including daily and monthly exercise summaries, daily session notes with ECG summaries, education notes, daily medication reconciliation, and daily physician supervision.      INITIAL EVALUATION SUMMARY DATE:  25      Patient's subjective report of progress/symptoms since event:   After event got Covid. Energy returning. Has been in the pool a few times this week.   Current functional status:  home exercise/ADLs/recreational hobbies:  No limitations. Back to cutting the grass.   Work Status:   full time job  at [employer]   Clinical Comments:   Elevated resting /90. Will continue to monitor.     Initial Fitness Assessment:   Submaximal TM ETT:  METs:  7.5      Dx:   Encounter Diagnoses   Name Primary?    Coronary artery disease involving native coronary artery     Stented coronary artery Yes       Description of Diagnosis: 60 y/o male with a PMH of CAD s/p PCI/DIANNE to the dLCx and HLD who is presenting today for hospital follow up. He was admitted to [facility] 25-25 with chest discomfort and elevated troponin. Underwent cardiac catheterization an had successful stenting to the dLCx.   Date of onset: 25      ASSESSMENT    Medical History:   Past Medical History[1]    Family History:  Family History[2]    Allergies:   Patient has no known allergies.    Current Medications: yes  Current  "Medications[3]    Medication compliance:  Pt reports to be compliant with medications    Physical Limitations: none    Fall Risk: Low   Comments: Ambulates with a steady gait with no assist device and Denies a fall in the past 6 months      CARDIAC RISK FACTOR MODIFICATION:  Cardiac risk factor modification, including education, counseling, and   behavioral intervention will be provided tailored to the patients' needs.    Cholesterol: Yes  HTN: No  DM: No  Obesity: No   Inactivity: No      EXERCISE ASSESSMENT:      SMART Exercise Goals:   improvement of 0.5 to 1.0 MET in the fitness assessment  increased peak METs tolerated in rehab exercise session  maintain > 150 minutes per week of moderate intensity exercise    Patient Specific EXERCISE GOALS:       Improve energy   Consistent, formal exercise routine     Functional Capacity Screening Tool:  [facility] Activity Status Index:  8.97 METs    NUTRITION ASSESSMENT:    Initial Weight:  214  Current Weight:     Height:   Ht Readings from Last 1 Encounters:   06/12/25 6' 2\" (1.88 m)       Rate Your Plate Score: 70/81    Diabetes: N/A    Lipid management: Discussed diet and lipid management and Last lipid profile 5/21/25  Chol 140    HDL 38  LDL 71    Current Dietary Habits:  Eats oatmeal every morning with skim milk, eat lots of chicken, trys to stay away from fried foods, reads labels, mainly water, a cup of coffee, no soda. No excess salt.     SMART Nutrition Goals:   Improved Rate Your Plate score  >64    Patient Specific NUTRITION GOALS:     1. Drink in moderation    2. Portion size     Drug/Alcohol Use:   Yes      PSYCHOSOCIAL ASSESSMENT:    Date of last Assessment:  6/24/25  Depression screening:  PHQ-9 = 1    Interpretation:  1-4 = Minimal Depression  Anxiety screening:  HAWK-7 = 0    Interpretation: 0-4  = Not anxious    Pt self-report of depression and anxiety   Patient reports they are coping well with good social support and denies depression or " anxiety    Self-reported stress level:  2   Stressors:  work  Stress Management Tools: spend time outside  spend time with family    SMART Psychosocial Goals:     feel less tired with more energy    Patient Specific PSYCHOCOSOCIAL GOALS:    Improved energy    Quality of Life Screen:  (Higher score indicates disease impact on QOL)  [facility] COOP score: 16/45     Social Support:   spouse  Community/Social Activities:      Psychosocial Assessment as it relates to rehabilitation:   Patient denies issues with his/her family or home life that may affect their rehabilitation efforts.       OTHER CORE COMPONENT ASSESSMENT:    Tobacco Use:     N/A:  Patient is a non-smoker     Anginal Symptoms:  chest pressure, LORENZO, and chest pain   NTG use: No prescription    SMART Goals:   consistent, controlled resting BP < 130/80 and medication compliance    Patient Specific CORE COMPONENT GOALS:    Monitor BP PRN   Monitor for symptoms      INDIVIDUALIZED TREATMENT PLAN      EXERCISE GOALS and PLAN      Progress toward Exercise goals:   Reviewed Pt goals and determined plan of care    Exercise Plan:    progress workloads as tolerated to maintain RPE 4-6/10  patient will add home exercise 30-45 mins 1-2 days to supplement cardiac rehab  class: Risk Factors for Heart Disease    The patient was counseled on exercise guidelines to achieve a minimum of 150 mins/wk of moderate intensity (RPE 4-6)   exercise and encouraged to add 1-2 days of exercise on opposite days of cardiac rehab as tolerated.       PHYSICIAN PRESCRIBED EXERCISE:    Current Aerobic Exercise Prescription:      Frequency: 3 days/week   Supplement with home exercise 2+ days/wk as tolerated       Minutes: 20 - 40         METS: 3.0-5.0            HR: Intensity based on RPE 4-6    RPE: 4-6         Modalities: Treadmill, Airdyne bike, UBE, Lifecycle, Rower, NuStep, and Recumbent bike     Exercise workloads will be progressed gradually as tolerated, within limits of patient's  ability, and according to the patient's   response to the exercise program.      Aerobic Exercise Prescription Plan for Progression   Frequency: 3 days/week of cardiac rehab       Supplement with home exercise 2+ days/wk as tolerated    Minutes: 40       >150 mins/wk of moderate intensity exercise   METS: 4.0-6.0   HR: Intensity based on RPE 4-6      RPE: 4-6   Modalities: Treadmill, Airdyne bike, Lifecycle, Elliptical, and Rower    Strength training:  Will be added following 2-3 weeks of monitored exercise sessions   Modalities: Leg Press, Chest Press, Pull Downs, and Lateral Raise    Home Exercise: none    Exercise Education: benefit of exercise for CAD risk factors, home exercise guidelines, AHA guidelines to achieve >150 mins/wk of moderate exercise, and RPE scale     Readiness to change: Preparation:  (Getting ready to change)       NUTRITION GOALS AND PLAN      Nutritional   Reviewed patient's Rate your Plate. Discussed key elements of heart healthy eating. Reviewed patient goals for dietary modifications and their clinical implications.  Reviewed most recent lipid profile.     Patient's progress toward Nutrition goals:    Reviewed Pt goals and determined plan of care      Nutrition Plan:   group class: Reading Food Labels  group Class: Heart Healthy Eating    Measurable goals were based Rate Your Plate Dietary Self-Assessment. These are the areas in which the patient could score higher on the assessment.  Goals include recommendations for a heart healthy diet based on American Heart Association.    Nutrition Education:   low sodium diet  maintaining hydration  portion control    Readiness to change: Action:  (Changing behavior)      PSYCHOSOCIAL GOALS AND PLAN    Psychosocial Assessment as it relates to rehabilitation:   Patient denies issues with his/her family or home life that may affect their rehabilitation efforts.     Patient's progress toward Psychosocial goals:    Reviewed Pt goals and determined plan  of care    Psychosocial Plan:   Class: Stress and Your Health, Class: Relaxation, spend time outdoors, and enjoy family    Psychosocial Education: benefits of a positive support system    Information to utilize [employer] was provided as well as contact information for counseling through  Behavioral Health and group psychotherapy groups available.    Readiness to change: Maintenance: (Maintaining the behavior change)      OTHER CORE COMPONENTS GOALS and PLAN      Blood Pressure will be monitored throughout the program and cardiologist will be notified of elevated trends.    Pt will be encouraged to monitor home BP if advised by cardiologist.    Tobacco Plan:   N/A:  Pt is a non-smoker    Progress toward Core Component goals:   Reviewed Pt goals and determined plan of care    Other Core Components Plan:   monitor home BP  medication compliance  check labels for sodium content  avoid processed foods  engage in regular exercise for BP control    Group and Individual Education:  components of blood pressure management and medication review     Readiness to change: Preparation:  (Getting ready to change)          [1]   Past Medical History:  Diagnosis Date    COVID-19     No pertinent past medical history    [2]   Family History  Problem Relation Name Age of Onset    No Known Problems Mother      Hyperlipidemia Father Dad     COPD Father Dad     No Known Problems Sister      No Known Problems Brother     [3]   Current Outpatient Medications   Medication Sig Dispense Refill    aspirin 81 mg chewable tablet Chew 1 tablet (81 mg total) daily 30 tablet 11    atorvastatin (LIPITOR) 40 mg tablet Take 1 tablet (40 mg total) by mouth daily 30 tablet 11    clopidogrel (Plavix) 75 mg tablet Take 1 tablet (75 mg total) by mouth daily 30 tablet 11    Coenzyme Q10 (CO Q 10 PO) Take by mouth in the morning.      Cyanocobalamin (VITAMIN B-12 PO) Take by mouth in the morning.      metoprolol succinate (TOPROL-XL) 25 mg 24 hr tablet  Take 1 tablet (25 mg total) by mouth daily 90 tablet 1    Multiple Vitamin (MULTIVITAMIN) capsule Take 1 capsule by mouth in the morning.      Omega-3 Fatty Acids (FISH OIL) 1,000 mg Take 1,000 mg by mouth in the morning.      VITAMIN D PO Take by mouth in the morning.       No current facility-administered medications for this visit.

## 2025-07-03 LAB
ALBUMIN SERPL-MCNC: 4.2 G/DL (ref 3.5–5.7)
ALP SERPL-CCNC: 53 U/L (ref 35–120)
ALT SERPL-CCNC: 30 U/L
ANION GAP SERPL CALCULATED.3IONS-SCNC: 7 MMOL/L (ref 3–11)
AST SERPL-CCNC: 18 U/L
BASOPHILS # BLD AUTO: 0 THOU/CMM (ref 0–0.1)
BASOPHILS NFR BLD AUTO: 1 %
BILIRUB SERPL-MCNC: 0.4 MG/DL (ref 0.2–1)
BUN SERPL-MCNC: 18 MG/DL (ref 7–28)
CALCIUM SERPL-MCNC: 8.9 MG/DL (ref 8.5–10.5)
CHLORIDE SERPL-SCNC: 105 MMOL/L (ref 100–109)
CHOLEST SERPL-MCNC: 112 MG/DL
CHOLEST/HDLC SERPL: 3.3 {RATIO}
CO2 SERPL-SCNC: 30 MMOL/L (ref 21–31)
CREAT SERPL-MCNC: 1.1 MG/DL (ref 0.53–1.3)
CYTOLOGY CMNT CVX/VAG CYTO-IMP: ABNORMAL
DIFFERENTIAL METHOD BLD: NORMAL
EOSINOPHIL # BLD AUTO: 0.2 THOU/CMM (ref 0–0.5)
EOSINOPHIL NFR BLD AUTO: 6 %
ERYTHROCYTE [DISTWIDTH] IN BLOOD BY AUTOMATED COUNT: 13 % (ref 12–16)
GFR/BSA.PRED SERPLBLD CYS-BASED-ARV: 77 ML/MIN/{1.73_M2}
GLUCOSE SERPL-MCNC: 104 MG/DL (ref 65–99)
GLUCOSE UR QL STRIP: NEGATIVE MG/DL
HCT VFR BLD AUTO: 41.1 % (ref 37–48)
HDLC SERPL-MCNC: 34 MG/DL (ref 23–92)
HGB BLD-MCNC: 13.3 G/DL (ref 12.5–17)
HGB UR QL STRIP: NEGATIVE MG/DL
KETONES UR QL STRIP: NEGATIVE MG/DL
LDLC SERPL CALC-MCNC: 46 MG/DL
LEUKOCYTE ESTERASE UR QL STRIP: NEGATIVE /UL
LYMPHOCYTES # BLD AUTO: 1.4 THOU/CMM (ref 1–3)
LYMPHOCYTES NFR BLD AUTO: 34 %
MCH RBC QN AUTO: 28.2 PG (ref 27–36)
MCHC RBC AUTO-ENTMCNC: 32.4 G/DL (ref 32–37)
MCV RBC AUTO: 87 FL (ref 80–100)
MONOCYTES # BLD AUTO: 0.4 THOU/CMM (ref 0.3–1)
MONOCYTES NFR BLD AUTO: 10 %
NEUTROPHILS # BLD AUTO: 2 THOU/CMM (ref 1.8–7.8)
NEUTROPHILS NFR BLD AUTO: 49 %
NITRITE UR QL STRIP: NEGATIVE
NONHDLC SERPL-MCNC: 78 MG/DL
PH UR: 7 [PH] (ref 4.5–8)
PLATELET # BLD AUTO: 191 THOU/CMM (ref 140–350)
PMV BLD REES-ECKER: 8.4 FL (ref 7.5–11.3)
POTASSIUM SERPL-SCNC: 4.5 MMOL/L (ref 3.5–5.2)
PROT 24H UR-MRATE: NEGATIVE MG/DL
PROT SERPL-MCNC: 5.9 G/DL (ref 6.3–8.3)
PSA SERPL-MCNC: 0.37 NG/ML
RBC # BLD AUTO: 4.74 MILL/CMM (ref 4–5.4)
SL AMB POCT URINE COMMENT: NORMAL
SODIUM SERPL-SCNC: 142 MMOL/L (ref 135–145)
SP GR UR: 1.02 (ref 1–1.03)
TRIGL SERPL-MCNC: 160 MG/DL
TSH SERPL-ACNC: 1.41 UIU/ML (ref 0.45–5.33)
WBC # BLD AUTO: 4.1 THOU/CMM (ref 4–10.5)

## 2025-07-07 ENCOUNTER — OFFICE VISIT (OUTPATIENT)
Dept: INTERNAL MEDICINE CLINIC | Facility: CLINIC | Age: 60
End: 2025-07-07
Payer: COMMERCIAL

## 2025-07-07 ENCOUNTER — CLINICAL SUPPORT (OUTPATIENT)
Dept: CARDIAC REHAB | Age: 60
End: 2025-07-07
Payer: COMMERCIAL

## 2025-07-07 VITALS
OXYGEN SATURATION: 96 % | BODY MASS INDEX: 27.08 KG/M2 | HEIGHT: 74 IN | DIASTOLIC BLOOD PRESSURE: 84 MMHG | TEMPERATURE: 98.6 F | WEIGHT: 211 LBS | SYSTOLIC BLOOD PRESSURE: 136 MMHG | HEART RATE: 74 BPM

## 2025-07-07 DIAGNOSIS — Z95.5 STENTED CORONARY ARTERY: Primary | ICD-10-CM

## 2025-07-07 DIAGNOSIS — Z00.00 ANNUAL PHYSICAL EXAM: Primary | ICD-10-CM

## 2025-07-07 DIAGNOSIS — I25.10 CORONARY ARTERY DISEASE INVOLVING NATIVE CORONARY ARTERY OF NATIVE HEART WITHOUT ANGINA PECTORIS: ICD-10-CM

## 2025-07-07 DIAGNOSIS — I77.4 CELIAC ARTERY STENOSIS (HCC): ICD-10-CM

## 2025-07-07 DIAGNOSIS — K40.90 LEFT INGUINAL HERNIA: ICD-10-CM

## 2025-07-07 PROCEDURE — 99396 PREV VISIT EST AGE 40-64: CPT | Performed by: INTERNAL MEDICINE

## 2025-07-07 PROCEDURE — 93798 PHYS/QHP OP CAR RHAB W/ECG: CPT

## 2025-07-07 NOTE — PATIENT INSTRUCTIONS
"Patient Education     Routine physical for adults   The Basics   Written by the doctors and editors at [facility]   What is a physical? -- A physical is a routine visit, or \"check-up,\" with your doctor. You might also hear it called a \"wellness visit\" or \"preventive visit.\"  During each visit, the doctor will:   Ask about your physical and mental health   Ask about your habits, behaviors, and lifestyle   Do an exam   Give you vaccines if needed   Talk to you about any medicines you take   Give advice about your health   Answer your questions  Getting regular check-ups is an important part of taking care of your health. It can help your doctor find and treat any problems you have. But it's also important for preventing health problems.  A routine physical is different from a \"sick visit.\" A sick visit is when you see a doctor because of a health concern or problem. Since physicals are scheduled ahead of time, you can think about what you want to ask the doctor.  How often should I get a physical? -- It depends on your age and health. In general, for people age 21 years and older:   If you are younger than 50 years, you might be able to get a physical every 3 years.   If you are 50 years or older, your doctor might recommend a physical every year.  If you have an ongoing health condition, like diabetes or high blood pressure, your doctor will probably want to see you more often.  What happens during a physical? -- In general, each visit will include:   Physical exam - The doctor or nurse will check your height, weight, heart rate, and blood pressure. They will also look at your eyes and ears. They will ask about how you are feeling and whether you have any symptoms that bother you.   Medicines - It's a good idea to bring a list of all the medicines you take to each doctor visit. Your doctor will talk to you about your medicines and answer any questions. Tell them if you are having any side effects that bother you. You " "should also tell them if you are having trouble paying for any of your medicines.   Habits and behaviors - This includes:   Your diet   Your exercise habits   Whether you smoke, drink alcohol, or use drugs   Whether you are sexually active   Whether you feel safe at home  Your doctor will talk to you about things you can do to improve your health and lower your risk of health problems. They will also offer help and support. For example, if you want to quit smoking, they can give you advice and might prescribe medicines. If you want to improve your diet or get more physical activity, they can help you with this, too.   Lab tests, if needed - The tests you get will depend on your age and situation. For example, your doctor might want to check your:   Cholesterol   Blood sugar   Iron level   Vaccines - The recommended vaccines will depend on your age, health, and what vaccines you already had. Vaccines are very important because they can prevent certain serious or deadly infections.   Discussion of screening - \"Screening\" means checking for diseases or other health problems before they cause symptoms. Your doctor can recommend screening based on your age, risk, and preferences. This might include tests to check for:   Cancer, such as breast, prostate, cervical, ovarian, colorectal, prostate, lung, or skin cancer   Sexually transmitted infections, such as chlamydia and gonorrhea   Mental health conditions like depression and anxiety  Your doctor will talk to you about the different types of screening tests. They can help you decide which screenings to have. They can also explain what the results might mean.   Answering questions - The physical is a good time to ask the doctor or nurse questions about your health. If needed, they can refer you to other doctors or specialists, too.  Adults older than 65 years often need other care, too. As you get older, your doctor will talk to you about:   How to prevent falling at " home   Hearing or vision tests   Memory testing   How to take your medicines safely   Making sure that you have the help and support you need at home  All topics are updated as new evidence becomes available and our peer review process is complete.  This topic retrieved from [employer] on: May 02, 2024.  Topic 485289 Version 1.0  Release: 32.4.3 - C32.122  © 2024 UpToDate, Inc. and/or its affiliates. All rights reserved.  Consumer Information Use and Disclaimer   Disclaimer: This generalized information is a limited summary of diagnosis, treatment, and/or medication information. It is not meant to be comprehensive and should be used as a tool to help the user understand and/or assess potential diagnostic and treatment options. It does NOT include all information about conditions, treatments, medications, side effects, or risks that may apply to a specific patient. It is not intended to be medical advice or a substitute for the medical advice, diagnosis, or treatment of a health care provider based on the health care provider's examination and assessment of a patient's specific and unique circumstances. Patients must speak with a health care provider for complete information about their health, medical questions, and treatment options, including any risks or benefits regarding use of medications. This information does not endorse any treatments or medications as safe, effective, or approved for treating a specific patient. UpToDate, Inc. and its affiliates disclaim any warranty or liability relating to this information or the use thereof.The use of this information is governed by the Terms of Use, available at https://www.woltersTalkitouwer.com/en/know/clinical-effectiveness-terms. 2024© UpToDate, Inc. and its affiliates and/or licensors. All rights reserved.  Copyright   © 2024 UpToDate, Inc. and/or its affiliates. All rights reserved.

## 2025-07-07 NOTE — PROGRESS NOTES
Adult Annual Physical  Name: Odilon Hess      : 1965      MRN: [id number]  Encounter Provider: Abdifatah Schmidt MD  Encounter Date: 2025   Encounter department: [facility] INTERNAL MEDICINE    :  Assessment & Plan  Annual physical exam         Coronary artery disease involving native coronary artery of native heart without angina pectoris         Celiac artery stenosis (HCC)         Left inguinal hernia             Preventive Screenings:    - Prostate cancer screening: screening up-to-date     Immunizations:  - Immunizations due: Prevnar 20         History of Present Illness     Adult Annual Physical:  Patient presents for annual physical.     Diet and Physical Activity:  - Diet/Nutrition: well balanced diet, heart healthy (low sodium) diet and adequate whole grain intake.  - Exercise: walking and 3-4 times a week on average.    General Health:  - Sleep: 7-8 hours of sleep on average.  - Hearing: normal hearing bilateral ears.  - Vision: no vision problems and previous LASIK surgery.  - Dental: regular dental visits.    /GYN Health:  - Follows with GYN: no.   - History of STDs: no     Health:  - History of STDs: no.   - Urinary symptoms: none.     Advanced Care Planning:  - Has an advanced directive?: no    - Has a durable medical POA?: no      Review of Systems   Constitutional:  Negative for appetite change, chills, fatigue and fever.   HENT:  Negative for sore throat and trouble swallowing.    Eyes:  Negative for redness.   Respiratory:  Negative for shortness of breath.    Cardiovascular:  Negative for chest pain and palpitations.   Gastrointestinal:  Negative for abdominal pain, constipation and diarrhea.   Genitourinary:  Negative for dysuria and hematuria.   Musculoskeletal:  Negative for back pain and neck pain.   Skin:  Negative for rash.   Neurological:  Negative for seizures, weakness and headaches.   Hematological:  Negative for adenopathy.   Psychiatric/Behavioral:  Negative  "for confusion. The patient is not nervous/anxious.          Objective   /84 (BP Location: Left arm, Patient Position: Sitting, Cuff Size: Standard)   Pulse 74   Temp 98.6 °F (37 °C) (Temporal)   Ht 6' 2\" (1.88 m)   Wt 95.7 kg (211 lb)   SpO2 96%   BMI 27.09 kg/m²     Physical Exam  Vitals and nursing note reviewed.   Constitutional:       General: He is not in acute distress.     Appearance: He is well-developed.   HENT:      Head: Normocephalic and atraumatic.      Mouth/Throat:      Mouth: Mucous membranes are moist.     Eyes:      Conjunctiva/sclera: Conjunctivae normal.       Cardiovascular:      Rate and Rhythm: Normal rate and regular rhythm.      Heart sounds: No murmur heard.  Pulmonary:      Effort: Pulmonary effort is normal. No respiratory distress.      Breath sounds: Normal breath sounds.   Abdominal:      Palpations: Abdomen is soft.      Tenderness: There is no abdominal tenderness.     Musculoskeletal:         General: No swelling.      Cervical back: Normal range of motion and neck supple.     Skin:     General: Skin is warm and dry.      Capillary Refill: Capillary refill takes less than 2 seconds.     Neurological:      General: No focal deficit present.      Mental Status: He is alert.     Psychiatric:         Mood and Affect: Mood normal.       "

## 2025-07-11 ENCOUNTER — CLINICAL SUPPORT (OUTPATIENT)
Dept: CARDIAC REHAB | Age: 60
End: 2025-07-11
Payer: COMMERCIAL

## 2025-07-11 DIAGNOSIS — Z95.5 STENTED CORONARY ARTERY: Primary | ICD-10-CM

## 2025-07-11 PROCEDURE — 93798 PHYS/QHP OP CAR RHAB W/ECG: CPT

## 2025-07-14 ENCOUNTER — CLINICAL SUPPORT (OUTPATIENT)
Dept: CARDIAC REHAB | Age: 60
End: 2025-07-14
Payer: COMMERCIAL

## 2025-07-14 DIAGNOSIS — Z95.5 STENTED CORONARY ARTERY: Primary | ICD-10-CM

## 2025-07-14 PROCEDURE — 93798 PHYS/QHP OP CAR RHAB W/ECG: CPT

## 2025-07-18 ENCOUNTER — CLINICAL SUPPORT (OUTPATIENT)
Dept: CARDIAC REHAB | Age: 60
End: 2025-07-18
Payer: COMMERCIAL

## 2025-07-18 DIAGNOSIS — Z95.5 STENTED CORONARY ARTERY: Primary | ICD-10-CM

## 2025-07-18 PROCEDURE — 93798 PHYS/QHP OP CAR RHAB W/ECG: CPT

## 2025-07-21 ENCOUNTER — CLINICAL SUPPORT (OUTPATIENT)
Dept: CARDIAC REHAB | Age: 60
End: 2025-07-21
Payer: COMMERCIAL

## 2025-07-21 DIAGNOSIS — Z95.5 STENTED CORONARY ARTERY: Primary | ICD-10-CM

## 2025-07-21 PROCEDURE — 93798 PHYS/QHP OP CAR RHAB W/ECG: CPT

## 2025-07-25 ENCOUNTER — CLINICAL SUPPORT (OUTPATIENT)
Dept: CARDIAC REHAB | Age: 60
End: 2025-07-25
Payer: COMMERCIAL

## 2025-07-25 DIAGNOSIS — Z95.5 STENTED CORONARY ARTERY: Primary | ICD-10-CM

## 2025-07-25 PROCEDURE — 93798 PHYS/QHP OP CAR RHAB W/ECG: CPT

## 2025-07-25 NOTE — PROGRESS NOTES
CARDIAC REHABILITATION   ASSESSMENT AND INDIVIDUALIZED TREATMENT PLAN  30 DAY REASSESSMENT      Today's date: 2025   # of Exercise Sessions Completed: 7  Patient name: Odilon Hess      : 1965  Age: 60 y.o.       MRN: [id number]  Referring Physician: Dr. Marlow   Cardiologist: pt unsure. Will send to Dr. Fernandez  Provider: Teddy  Clinician: Jenifer Gonzales MS, EP       Treatment is tailored to this patient's individual needs.  The ITP was reviewed with the patient and all questions were answered to their satisfaction.  Additional ITP documentation can be found electronically including daily and monthly exercise summaries, daily session notes with ECG summaries, education notes, daily medication reconciliation, and daily physician supervision.    REASSESSMENT SUMMARY   DATE:  25    See ITP below for further details including patient goals and plan of care for progression.    Resting BP  132/88 - 132/90,  HR 77 - 78  Exercise /80- 190/96.   - 118  Exercise session details:  40-45 minutes,  3.7 - 4.4 METs  Telemetry:  NSR  Symptoms: N/A  Current functional status:    home exercise: patient walking outside every other day 2-3 miles   ADLs: patient back to do everything outside, mowing the grass, yard work, patient still working full time   Patient's subjective report of progress in the past 30 days:  Pt feeling better, increased stamina, walking faster without getting as tired, strength feels the same but pt just started doing weights at rehab and looks forward to increasing these   Patient has made or is working on dietary modifications:    Yes :  pt stated he always watched what he ate before, patient reading food labels more, continuing to reduce sodium, does not eat/drink much added sugars, does drink beet juice in the morning, no soda, eating chicken and oatmeal, does drink beer in moderation       INITIAL EVALUATION SUMMARY DATE:  25    Patient's subjective report of progress/symptoms  since event:   After event got Covid. Energy returning. Has been in the pool a few times this week.   Current functional status:  home exercise/ADLs/recreational hobbies:  No limitations. Back to cutting the grass.   Work Status:   full time job  at [employer]   Clinical Comments:   Elevated resting /90. Will continue to monitor.     Initial Fitness Assessment:   Submaximal TM ETT:  METs:  7.5      Dx:   No diagnosis found.      Description of Diagnosis: 58 y/o male with a PMH of CAD s/p PCI/DIANNE to the dLCx and HLD who is presenting today for hospital follow up. He was admitted to [facility] 05/20/25-05/21/25 with chest discomfort and elevated troponin. Underwent cardiac catheterization an had successful stenting to the dLCx.   Date of onset: 5/20/25      ASSESSMENT    Medical History:   Past Medical History[1]    Family History:  Family History[2]    Allergies:   Patient has no known allergies.    Current Medications: yes  Current Medications[3]    Medication compliance:  Pt reports to be compliant with medications    Physical Limitations: none    Fall Risk: Low   Comments: Ambulates with a steady gait with no assist device and Denies a fall in the past 6 months      CARDIAC RISK FACTOR MODIFICATION:  Cardiac risk factor modification, including education, counseling, and   behavioral intervention will be provided tailored to the patients' needs.    Cholesterol: Yes  HTN: No  DM: No  Obesity: No   Inactivity: No      EXERCISE ASSESSMENT:      SMART Exercise Goals:   improvement of 0.5 to 1.0 MET in the fitness assessment  increased peak METs tolerated in rehab exercise session  maintain > 150 minutes per week of moderate intensity exercise    Patient Specific EXERCISE GOALS:       Improve energy   Consistent, formal exercise routine     Functional Capacity Screening Tool:  [facility] Activity Status Index:  8.97 METs    NUTRITION ASSESSMENT:    Initial Weight:  214  Current Weight: 212    Height:  "  Ht Readings from Last 1 Encounters:   07/07/25 6' 2\" (1.88 m)       Rate Your Plate Score: 70/81    Diabetes: N/A    Lipid management: Discussed diet and lipid management and Last lipid profile 5/21/25  Chol 140    HDL 38  LDL 71    Current Dietary Habits:  Eats oatmeal every morning with skim milk, eat lots of chicken, trys to stay away from fried foods, reads labels, mainly water, a cup of coffee, no soda. No excess salt.     SMART Nutrition Goals:   Improved Rate Your Plate score  >64    Patient Specific NUTRITION GOALS:     1. Drink in moderation    2. Portion size     Drug/Alcohol Use:   Yes      PSYCHOSOCIAL ASSESSMENT:    Date of last Assessment:  6/24/25  Depression screening:  PHQ-9 = 1    Interpretation:  1-4 = Minimal Depression  Anxiety screening:  HAWK-7 = 0    Interpretation: 0-4  = Not anxious    Pt self-report of depression and anxiety   Patient reports they are coping well with good social support and denies depression or anxiety    Self-reported stress level:  2   Stressors:  work  Stress Management Tools: spend time outside  spend time with family    SMART Psychosocial Goals:     feel less tired with more energy    Patient Specific PSYCHOCOSOCIAL GOALS:    Improved energy    Quality of Life Screen:  (Higher score indicates disease impact on QOL)  [facility] COOP score: 16/45     Social Support:   spouse  Community/Social Activities:      Psychosocial Assessment as it relates to rehabilitation:   Patient denies issues with his/her family or home life that may affect their rehabilitation efforts.       OTHER CORE COMPONENT ASSESSMENT:    Tobacco Use:     N/A:  Patient is a non-smoker     Anginal Symptoms:  chest pressure, LORENZO, and chest pain   NTG use: No prescription    SMART Goals:   consistent, controlled resting BP < 130/80 and medication compliance    Patient Specific CORE COMPONENT GOALS:    Monitor BP PRN   Monitor for symptoms      INDIVIDUALIZED TREATMENT PLAN      EXERCISE GOALS and " PLAN      Progress toward Exercise goals:   Pt is progressing and showing improvement  toward the following goals:  pt walking every other day at home outside 2-3 miles at a time, pt has improved stamina, strength feels the same but he just started weight training and plans to increase these soon.  , Will continue to educate and progress as tolerated.    Exercise Plan:    progress workloads as tolerated to maintain RPE 4-6/10  patient will add home exercise 30-45 mins 1-2 days to supplement cardiac rehab  class: Risk Factors for Heart Disease    The patient was counseled on exercise guidelines to achieve a minimum of 150 mins/wk of moderate intensity (RPE 4-6)   exercise and encouraged to add 1-2 days of exercise on opposite days of cardiac rehab as tolerated.       PHYSICIAN PRESCRIBED EXERCISE:    Current Aerobic Exercise Prescription:      Frequency: 3 days/week   Supplement with home exercise 2+ days/wk as tolerated       Minutes: 20 - 40         METS: 3.7-4.4           HR: Intensity based on RPE 4-6    RPE: 4-6         Modalities: Treadmill, Airdyne bike, UBE, Lifecycle, and NuStep     Exercise workloads will be progressed gradually as tolerated, within limits of patient's ability, and according to the patient's   response to the exercise program.      Aerobic Exercise Prescription Plan for Progression   Frequency: 3 days/week of cardiac rehab       Supplement with home exercise 2+ days/wk as tolerated    Minutes: 40       >150 mins/wk of moderate intensity exercise   METS: 4.0-5.2   HR: Intensity based on RPE 4-6      RPE: 4-6   Modalities: Treadmill, Airdyne bike, Lifecycle, Elliptical, and Rower    Strength trainin-3 days / week  12-15 repetitions   Modalities: Leg Press, Chest Press, Pull Downs, and Lateral Raise    Home Exercise: pt walking every other day 2-3 miles     Exercise Education: benefit of exercise for CAD risk factors, home exercise guidelines, AHA guidelines to achieve >150 mins/wk of  moderate exercise, and RPE scale     Readiness to change: Action:  (Changing behavior)      NUTRITION GOALS AND PLAN      Nutritional   Reviewed patient's Rate your Plate. Discussed key elements of heart healthy eating. Reviewed patient goals for dietary modifications and their clinical implications.  Reviewed most recent lipid profile.     Patient's progress toward Nutrition goals:    Pt is progressing and showing improvement  toward the following goals:  pt stated he always watched what he ate at home, reading food labels closer, reducing sodium, does not eat or drink many added sugars, eating enough protein, eating more chicken, does drink some beer in moderation .  , Will continue to educate and progress as tolerated.      Nutrition Plan:   group class: Reading Food Labels  group Class: Heart Healthy Eating    Measurable goals were based Rate Your Plate Dietary Self-Assessment. These are the areas in which the patient could score higher on the assessment.  Goals include recommendations for a heart healthy diet based on American Heart Association.    Nutrition Education:   low sodium diet  maintaining hydration  portion control    Readiness to change: Action:  (Changing behavior)      PSYCHOSOCIAL GOALS AND PLAN    Psychosocial Assessment as it relates to rehabilitation:   Patient denies issues with his/her family or home life that may affect their rehabilitation efforts.     Patient's progress toward Psychosocial goals:    Pt is progressing and showing improvement  toward the following goals:  no reports of anxiety or depression, pt working full time, no reported issues here, good support at home from family .  , Will continue to educate and progress as tolerated.    Psychosocial Plan:   Class: Stress and Your Health, Class: Relaxation, spend time outdoors, and enjoy family    Psychosocial Education: benefits of a positive support system    Information to utilize [employer] was provided as well as contact  information for counseling through  Behavioral Health and group psychotherapy groups available.    Readiness to change: Maintenance: (Maintaining the behavior change)      OTHER CORE COMPONENTS GOALS and PLAN      Blood Pressure will be monitored throughout the program and cardiologist will be notified of elevated trends.    Pt will be encouraged to monitor home BP if advised by cardiologist.    Tobacco Plan:   N/A:  Pt is a non-smoker    Progress toward Core Component goals:   Pt is progressing and showing improvement  toward the following goals:  pt monitoring symptoms at home, weighing himself at home, taking all medications, no reported side effects, doing well, continuing to progress.  , Will continue to educate and progress as tolerated.    Other Core Components Plan:   monitor home BP  medication compliance  check labels for sodium content  avoid processed foods  engage in regular exercise for BP control    Group and Individual Education:  components of blood pressure management and medication review     Readiness to change: Action:  (Changing behavior)         [1]   Past Medical History:  Diagnosis Date    COVID-19     Myocardial infarction (HCC) 5/19/25    No pertinent past medical history    [2]   Family History  Problem Relation Name Age of Onset    No Known Problems Mother      Hyperlipidemia Father Dad     COPD Father Dad     Hyperlipidemia Sister      Hyperlipidemia Brother     [3]   Current Outpatient Medications   Medication Sig Dispense Refill    aspirin 81 mg chewable tablet Chew 1 tablet (81 mg total) daily 30 tablet 11    atorvastatin (LIPITOR) 40 mg tablet Take 1 tablet (40 mg total) by mouth daily 30 tablet 11    clopidogrel (Plavix) 75 mg tablet Take 1 tablet (75 mg total) by mouth daily 30 tablet 11    Coenzyme Q10 (CO Q 10 PO) Take by mouth in the morning.      Cyanocobalamin (VITAMIN B-12 PO) Take by mouth in the morning.      metoprolol succinate (TOPROL-XL) 25 mg 24 hr tablet Take 1  tablet (25 mg total) by mouth daily 90 tablet 1    Multiple Vitamin (MULTIVITAMIN) capsule Take 1 capsule by mouth in the morning.      Omega-3 Fatty Acids (FISH OIL) 1,000 mg Take 1,000 mg by mouth in the morning.      VITAMIN D PO Take by mouth in the morning.       No current facility-administered medications for this visit.

## 2025-07-28 ENCOUNTER — CLINICAL SUPPORT (OUTPATIENT)
Dept: CARDIAC REHAB | Age: 60
End: 2025-07-28
Payer: COMMERCIAL

## 2025-07-28 DIAGNOSIS — Z95.5 STENTED CORONARY ARTERY: Primary | ICD-10-CM

## 2025-07-28 PROCEDURE — 93798 PHYS/QHP OP CAR RHAB W/ECG: CPT

## 2025-08-01 ENCOUNTER — CLINICAL SUPPORT (OUTPATIENT)
Dept: CARDIAC REHAB | Age: 60
End: 2025-08-01
Payer: COMMERCIAL

## 2025-08-01 DIAGNOSIS — Z95.5 STENTED CORONARY ARTERY: Primary | ICD-10-CM

## 2025-08-01 PROCEDURE — 93798 PHYS/QHP OP CAR RHAB W/ECG: CPT

## 2025-08-04 ENCOUNTER — CLINICAL SUPPORT (OUTPATIENT)
Dept: CARDIAC REHAB | Age: 60
End: 2025-08-04
Payer: COMMERCIAL

## 2025-08-04 DIAGNOSIS — Z95.5 STENTED CORONARY ARTERY: Primary | ICD-10-CM

## 2025-08-04 PROCEDURE — 93798 PHYS/QHP OP CAR RHAB W/ECG: CPT

## 2025-08-06 DIAGNOSIS — I25.10 CORONARY ARTERY DISEASE INVOLVING NATIVE CORONARY ARTERY: ICD-10-CM

## 2025-08-07 RX ORDER — ASPIRIN 81 MG/1
81 TABLET, CHEWABLE ORAL DAILY
Qty: 30 TABLET | Refills: 2 | Status: SHIPPED | OUTPATIENT
Start: 2025-08-07

## 2025-08-07 RX ORDER — CLOPIDOGREL BISULFATE 75 MG/1
75 TABLET ORAL DAILY
Qty: 30 TABLET | Refills: 2 | Status: SHIPPED | OUTPATIENT
Start: 2025-08-07

## 2025-08-07 RX ORDER — METOPROLOL SUCCINATE 25 MG/1
25 TABLET, EXTENDED RELEASE ORAL DAILY
Qty: 90 TABLET | Refills: 2 | Status: SHIPPED | OUTPATIENT
Start: 2025-08-07

## 2025-08-07 RX ORDER — ATORVASTATIN CALCIUM 40 MG/1
40 TABLET, FILM COATED ORAL DAILY
Qty: 30 TABLET | Refills: 2 | Status: SHIPPED | OUTPATIENT
Start: 2025-08-07

## 2025-08-08 ENCOUNTER — CLINICAL SUPPORT (OUTPATIENT)
Dept: CARDIAC REHAB | Age: 60
End: 2025-08-08
Payer: COMMERCIAL

## 2025-08-08 DIAGNOSIS — Z95.5 STENTED CORONARY ARTERY: Primary | ICD-10-CM

## 2025-08-08 PROCEDURE — 93798 PHYS/QHP OP CAR RHAB W/ECG: CPT

## 2025-08-11 ENCOUNTER — CLINICAL SUPPORT (OUTPATIENT)
Dept: CARDIAC REHAB | Age: 60
End: 2025-08-11
Payer: COMMERCIAL

## 2025-08-22 ENCOUNTER — CLINICAL SUPPORT (OUTPATIENT)
Dept: CARDIAC REHAB | Age: 60
End: 2025-08-22
Payer: COMMERCIAL

## 2025-08-22 DIAGNOSIS — Z95.5 STENTED CORONARY ARTERY: Primary | ICD-10-CM

## 2025-08-22 PROCEDURE — 93798 PHYS/QHP OP CAR RHAB W/ECG: CPT

## (undated) DEVICE — DGW .035 FC J3MM 260CM TEF: Brand: EMERALD

## (undated) DEVICE — RADIFOCUS OPTITORQUE ANGIOGRAPHIC CATHETER: Brand: OPTITORQUE

## (undated) DEVICE — CATH GUIDE LAUNCHER 6FR EBU 3.5

## (undated) DEVICE — BALLOON EUPHORA RX 2.5 X 15MM

## (undated) DEVICE — GUIDEWIRE WHOLEY HI TORQUE INTERM MOD J .035 145CM

## (undated) DEVICE — TR BAND RADIAL ARTERY COMPRESSION DEVICE: Brand: TR BAND

## (undated) DEVICE — BALLOON NC EUPHORA 3.5 X 15MM

## (undated) DEVICE — RUNTHROUGH NS EXTRA FLOPPY PTCA GUIDEWIRE: Brand: RUNTHROUGH

## (undated) DEVICE — GLIDESHEATH BASIC HYDROPHILIC COATED INTRODUCER SHEATH: Brand: GLIDESHEATH